# Patient Record
Sex: MALE | Race: WHITE | NOT HISPANIC OR LATINO | Employment: FULL TIME | URBAN - NONMETROPOLITAN AREA
[De-identification: names, ages, dates, MRNs, and addresses within clinical notes are randomized per-mention and may not be internally consistent; named-entity substitution may affect disease eponyms.]

---

## 2017-10-23 ENCOUNTER — OFFICE VISIT (OUTPATIENT)
Dept: FAMILY MEDICINE | Facility: OTHER | Age: 27
End: 2017-10-23
Attending: PHYSICIAN ASSISTANT
Payer: COMMERCIAL

## 2017-10-23 VITALS
DIASTOLIC BLOOD PRESSURE: 60 MMHG | BODY MASS INDEX: 25.62 KG/M2 | HEIGHT: 71 IN | TEMPERATURE: 97.9 F | SYSTOLIC BLOOD PRESSURE: 120 MMHG | OXYGEN SATURATION: 98 % | HEART RATE: 81 BPM | WEIGHT: 183 LBS

## 2017-10-23 DIAGNOSIS — Z00.00 ROUTINE GENERAL MEDICAL EXAMINATION AT A HEALTH CARE FACILITY: ICD-10-CM

## 2017-10-23 DIAGNOSIS — Z23 NEED FOR VACCINATION: Primary | ICD-10-CM

## 2017-10-23 PROCEDURE — 99395 PREV VISIT EST AGE 18-39: CPT | Mod: 25 | Performed by: PHYSICIAN ASSISTANT

## 2017-10-23 PROCEDURE — 90715 TDAP VACCINE 7 YRS/> IM: CPT | Performed by: PHYSICIAN ASSISTANT

## 2017-10-23 PROCEDURE — 90471 IMMUNIZATION ADMIN: CPT | Performed by: PHYSICIAN ASSISTANT

## 2017-10-23 ASSESSMENT — PATIENT HEALTH QUESTIONNAIRE - PHQ9
SUM OF ALL RESPONSES TO PHQ QUESTIONS 1-9: 3
5. POOR APPETITE OR OVEREATING: SEVERAL DAYS

## 2017-10-23 ASSESSMENT — ANXIETY QUESTIONNAIRES
5. BEING SO RESTLESS THAT IT IS HARD TO SIT STILL: NOT AT ALL
1. FEELING NERVOUS, ANXIOUS, OR ON EDGE: SEVERAL DAYS
GAD7 TOTAL SCORE: 7
3. WORRYING TOO MUCH ABOUT DIFFERENT THINGS: MORE THAN HALF THE DAYS
2. NOT BEING ABLE TO STOP OR CONTROL WORRYING: MORE THAN HALF THE DAYS
6. BECOMING EASILY ANNOYED OR IRRITABLE: SEVERAL DAYS
IF YOU CHECKED OFF ANY PROBLEMS ON THIS QUESTIONNAIRE, HOW DIFFICULT HAVE THESE PROBLEMS MADE IT FOR YOU TO DO YOUR WORK, TAKE CARE OF THINGS AT HOME, OR GET ALONG WITH OTHER PEOPLE: SOMEWHAT DIFFICULT
7. FEELING AFRAID AS IF SOMETHING AWFUL MIGHT HAPPEN: NOT AT ALL

## 2017-10-23 ASSESSMENT — PAIN SCALES - GENERAL: PAINLEVEL: NO PAIN (0)

## 2017-10-23 NOTE — PROGRESS NOTES
Subjective:  Kye Barlow is a 27 year old male who presents for work physical exam. No concerns    Past Medical History:   Diagnosis Date     Bronchitis, not specified as acute or chronic 3/1/2000       History reviewed. No pertinent surgical history.    History reviewed. No pertinent family history.    Social History   Substance Use Topics     Smoking status: Never Smoker     Smokeless tobacco: Current User     Types: Chew     Alcohol use Yes      Comment: social       No current outpatient prescriptions on file.     No current facility-administered medications for this visit.        No Known Allergies    Review of Systems:  Gen: negative for fever, chills, change in weight  Derm: negative for worrisome rashes, moles or lesions  Eyes: negative for vision changes or irritation  ENT: negative for ear, mouth and throat problems  Resp: negative for significant cough or SOB  CV: negative for chest pain, palpitations or peripheral edema  GI: negative for nausea, abdominal pain, heartburn, or change in bowel habits  : negative for dysuria, hematuria  Musculoskeletal: negative for significant arthralgias or myalgia  Neuro: negative for weakness, dizziness or paresthesias  Endo: negative for temperature intolerance, skin/hair changes  Psych: negative for changes in mood or affect    Objective:  B/P: 120/60, T: 97.9, P: 81, R: Data Unavailable  183 lbs 0 oz Body mass index is 25.89 kg/(m^2).           Physical Exam:  Constitutional: healthy, alert and no distress  Skin: No suspicious rash or skin lesion  Head: Normocephalic. No masses, lesions, tenderness or abnormalities  ENT: Oropharynx moist and pink. EAC's and TM's intact. No cervical lymphadenopathy. No thyromegaly  CV: RRR. No murmur. No pretibial edema  Pulm: Lungs clear to auscultation. No rhonchi, rales or wheeze  GI: Abdomen soft, non-tender. BS normal. No masses, organomegaly. No rebound or guarding.   Musculoskeletal: extremities normal- no gross  deformities noted, gait normal and normal muscle tone  Neuroc: Gait normal. Reflexes normal and symmetric. Sensation grossly WNL. CN 2-12 intact  Psych: Euthymic mood with corresponding affect.        Assessment and Plan:    (Z23) Need for vaccination  (primary encounter diagnosis  Plan: TDAP VACCINE (ADACEL) [55845.002], 1st          Administration  [42535]            (Z00.00) Routine general medical examination at a health care facility  Comment: Healthy.  Plan: Form completed      Stephanie Joyce PA-C

## 2017-10-23 NOTE — NURSING NOTE
"Chief Complaint   Patient presents with     Physical       Initial /60 (BP Location: Right arm, Patient Position: Chair, Cuff Size: Adult Regular)  Pulse 81  Temp 97.9  F (36.6  C) (Tympanic)  Ht 5' 10.5\" (1.791 m)  Wt 183 lb (83 kg)  SpO2 98%  BMI 25.89 kg/m2 Estimated body mass index is 25.89 kg/(m^2) as calculated from the following:    Height as of this encounter: 5' 10.5\" (1.791 m).    Weight as of this encounter: 183 lb (83 kg).  Medication Reconciliation: complete   LEVI ABURTO      "

## 2017-10-23 NOTE — MR AVS SNAPSHOT
"              After Visit Summary   10/23/2017    Kye Barlow    MRN: [id number]           Patient Information     Date Of Birth          1990        Visit Information        Provider Department      10/23/2017 2:30 PM Stephanie Joyce PA [facility]        Today's Diagnoses     Need for vaccination    -  1    Routine general medical examination at a health care facility           Follow-ups after your visit        Who to contact     If you have questions or need follow up information about today's clinic visit or your schedule please contact [facility] directly at [phone].  Normal or non-critical lab and imaging results will be communicated to you by Pili Pophart, letter or phone within 4 business days after the clinic has received the results. If you do not hear from us within 7 days, please contact the clinic through Pili Pophart or phone. If you have a critical or abnormal lab result, we will notify you by phone as soon as possible.  Submit refill requests through Provender or call your pharmacy and they will forward the refill request to us. Please allow 3 business days for your refill to be completed.          Additional Information About Your Visit        MyChart Information     Provender lets you send messages to your doctor, view your test results, renew your prescriptions, schedule appointments and more. To sign up, go to [email or web address] . Click on \"Log in\" on the left side of the screen, which will take you to the Welcome page. Then click on \"Sign up Now\" on the right side of the page.     You will be asked to enter the access code listed below, as well as some personal information. Please follow the directions to create your username and password.     Your access code is: [email or web address]  Expires: 2018  4:12 PM     Your access code will  in 90 days. If you need help or a new code, please call your [facility] or [phone].        Care EveryWhere ID  " "   This is your Care EveryWhere ID. This could be used by other organizations to access your [facility] medical records  [id number]        Your Vitals Were     Pulse Temperature Height Pulse Oximetry BMI (Body Mass Index)       81 97.9  F (36.6  C) (Tympanic) 5' 10.5\" (1.791 m) 98% 25.89 kg/m2        Blood Pressure from Last 3 Encounters:   10/23/17 120/60   02/23/15 109/73   02/13/15 130/80    Weight from Last 3 Encounters:   10/23/17 183 lb (83 kg)   02/23/15 185 lb (83.9 kg)   02/13/15 185 lb (83.9 kg)              We Performed the Following     1st  Administration  [67479]     TDAP VACCINE (ADACEL) [79614.002]        Primary Care Provider Office Phone # Fax #    Chivo Kemp [phone]       [address]  [facility] [address]        Equal Access to Services     BENEDICT REY : Hadii aad ku hadasho Soomaali, waaxda luqadaha, qaybta kaalmada adeegyada, waxay idiin hayaan adeeg kharash la'ramiro . So [facility] [phone].    ATENCIÓN: Si habla español, tiene a gupta disposición servicios gratuitos de asistencia lingüística. [facility] [phone].    We comply with applicable federal civil rights laws and [place] laws. We do not discriminate on the basis of race, color, national origin, age, disability, sex, sexual orientation, or gender identity.            Thank you!     Thank you for choosing [facility]  for your care. Our goal is always to provide you with excellent care. Hearing back from our patients is one way we can continue to improve our services. Please take a few minutes to complete the written survey that you may receive in the mail after your visit with us. Thank you!             Your Updated Medication List - Protect others around you: Learn how to safely use, store and throw away your medicines at www.disposemymeds.org.      Notice  As of 10/23/2017  4:12 PM    You have not been prescribed any medications.      "

## 2017-10-24 ASSESSMENT — ANXIETY QUESTIONNAIRES: GAD7 TOTAL SCORE: 7

## 2017-12-07 ENCOUNTER — APPOINTMENT (OUTPATIENT)
Dept: OCCUPATIONAL MEDICINE | Facility: OTHER | Age: 27
End: 2017-12-07

## 2017-12-07 PROCEDURE — 99000 SPECIMEN HANDLING OFFICE-LAB: CPT

## 2019-10-15 ENCOUNTER — APPOINTMENT (OUTPATIENT)
Dept: OCCUPATIONAL MEDICINE | Facility: OTHER | Age: 29
End: 2019-10-15

## 2019-10-15 PROCEDURE — 99000 SPECIMEN HANDLING OFFICE-LAB: CPT

## 2020-01-24 ENCOUNTER — OFFICE VISIT (OUTPATIENT)
Dept: FAMILY MEDICINE | Facility: OTHER | Age: 30
End: 2020-01-24
Attending: NURSE PRACTITIONER
Payer: COMMERCIAL

## 2020-01-24 VITALS
HEART RATE: 72 BPM | SYSTOLIC BLOOD PRESSURE: 120 MMHG | WEIGHT: 203.4 LBS | TEMPERATURE: 97.2 F | BODY MASS INDEX: 28.77 KG/M2 | DIASTOLIC BLOOD PRESSURE: 80 MMHG | OXYGEN SATURATION: 99 %

## 2020-01-24 DIAGNOSIS — H61.21 IMPACTED CERUMEN OF RIGHT EAR: ICD-10-CM

## 2020-01-24 DIAGNOSIS — H65.92 LEFT NON-SUPPURATIVE OTITIS MEDIA: Primary | ICD-10-CM

## 2020-01-24 PROCEDURE — 99214 OFFICE O/P EST MOD 30 MIN: CPT | Performed by: NURSE PRACTITIONER

## 2020-01-24 RX ORDER — AMOXICILLIN 875 MG
875 TABLET ORAL 2 TIMES DAILY
Qty: 20 TABLET | Refills: 0 | Status: SHIPPED | OUTPATIENT
Start: 2020-01-24 | End: 2020-03-02

## 2020-01-24 ASSESSMENT — ANXIETY QUESTIONNAIRES
IF YOU CHECKED OFF ANY PROBLEMS ON THIS QUESTIONNAIRE, HOW DIFFICULT HAVE THESE PROBLEMS MADE IT FOR YOU TO DO YOUR WORK, TAKE CARE OF THINGS AT HOME, OR GET ALONG WITH OTHER PEOPLE: NOT DIFFICULT AT ALL
1. FEELING NERVOUS, ANXIOUS, OR ON EDGE: SEVERAL DAYS
3. WORRYING TOO MUCH ABOUT DIFFERENT THINGS: SEVERAL DAYS
GAD7 TOTAL SCORE: 8
2. NOT BEING ABLE TO STOP OR CONTROL WORRYING: MORE THAN HALF THE DAYS
7. FEELING AFRAID AS IF SOMETHING AWFUL MIGHT HAPPEN: NOT AT ALL
6. BECOMING EASILY ANNOYED OR IRRITABLE: SEVERAL DAYS
5. BEING SO RESTLESS THAT IT IS HARD TO SIT STILL: SEVERAL DAYS

## 2020-01-24 ASSESSMENT — PATIENT HEALTH QUESTIONNAIRE - PHQ9
SUM OF ALL RESPONSES TO PHQ QUESTIONS 1-9: 10
5. POOR APPETITE OR OVEREATING: MORE THAN HALF THE DAYS

## 2020-01-24 ASSESSMENT — PAIN SCALES - GENERAL: PAINLEVEL: SEVERE PAIN (7)

## 2020-01-24 NOTE — PROGRESS NOTES
Subjective     Kye Barlow is a 29 year old male who presents to clinic today for the following health issues:    HPI  Ear pain       Duration: 3 days     Description (location/character/radiation): right ear     Intensity:  7/10    Accompanying signs and symptoms: pain, cant sleep at night . tinnitis    History (similar episodes/previous evaluation): has had ear problems since high school.     Precipitating or alleviating factors: None    Therapies tried and outcome: ear candle , ibu- yesterday. Nothing helping.        There is no problem list on file for this patient.    History reviewed. No pertinent surgical history.    Social History     Tobacco Use     Smoking status: Never Smoker     Smokeless tobacco: Current User     Types: Chew     Tobacco comment: declined quit plan 1/24/2020   Substance Use Topics     Alcohol use: Yes     Comment: social     History reviewed. No pertinent family history.      No current outpatient medications on file.     No Known Allergies    Reviewed and updated as needed this visit by Provider      Review of Systems   ROS COMP: Constitutional, HEENT, cardiovascular, pulmonary, gi and gu systems are negative, except as otherwise noted.+ear pain      Objective    /80   Pulse 72   Temp 97.2  F (36.2  C)   Wt 92.3 kg (203 lb 6.4 oz)   SpO2 99%   BMI 28.77 kg/m    Body mass index is 28.77 kg/m .  Physical Exam   GENERAL: healthy, alert and no distress  HENT: +right ear canal with cerumen impaction (after ear flush, clear effusion behind TM). +left middle ear with erythema and slight bulge to TM. Bilateral TM's intact. nose and mouth without ulcers or lesions  NECK: no adenopathy, no asymmetry, masses, or scars and thyroid normal to palpation  RESP: lungs clear to auscultation - no rales, rhonchi or wheezes  CV: regular rate and rhythm, normal S1 S2, no S3 or S4, no murmur, click or rub, no peripheral edema and peripheral pulses strong  MS: no gross musculoskeletal defects  noted, no edema  SKIN: no suspicious lesions or rashes  NEURO: Normal strength and tone, mentation intact and speech normal    Diagnostic Test Results:  Labs reviewed in Epic        Assessment & Plan   Assessment      Plan  (H65.92) Left non-suppurative otitis media  (primary encounter diagnosis)  Comment: ABX. Supportive care discussed  Plan: amoxicillin (AMOXIL) 875 MG tablet           (H61.21) Impacted cerumen of right ear  Comment: right ear flush per LPN with large amount of cerumen return  Plan: clear fluid behind tm. Add zyrtec daily for 10 days    Tobacco Cessation:   reports that he has never smoked. His smokeless tobacco use includes chew.      See Patient Instructions    Return if symptoms worsen or fail to improve.    Jessica Marin NP  [facility]

## 2020-01-24 NOTE — PATIENT INSTRUCTIONS
Patient Education     Reducing the Risk of Middle Ear Infections     Good handwashing can help your child prevent ear infections.     Most children have had at least one middle ear infection by the age of 2. Treatment may depend on whether the problem is acute or chronic. It also depends on how often it comes back and how long it lasts.  Reducing risk factors  Some behaviors or surroundings increase your child s risk of ear infection. Reducing such risk factors can be helpful at any point in treatment. The tips below may help:    If your child goes to group , he or she runs a greater risk of getting colds or flu. This may then lead to an ear infection. Help prevent these illnesses by teaching your child to wash his or her hands often.    If your child has nasal allergies, do your best to control dust, mold, mildew, and pet hair in the house. Also stop or greatly limit your child s contact with secondhand smoke.    If food allergies are a problem, identify the food that triggers the reaction. Help your child avoid it.  Watching and waiting  Sometimes it is better to proceed with caution in the following ways:    If your child is diagnosed with an ear infection, the healthcare provider may prescribe antibiotics and will suggest a period of  watchful waiting.  This means not filling any prescriptions right away. Instead of antibiotics, a trial of medicines to relieve symptoms including those for pain or fever, is advised. This is along with waiting some time to see if a child improves without antibiotic therapy. Whether or not your healthcare provider prescribes immediate antibiotics or a period of watchful waiting depends on your child's age and risk factors.    During this time, your child should be watched to see if his or her symptoms are improving and to make sure new symptoms, such as fever or vomiting, don't develop. If a child doesn't improve within a few days or develops new symptoms, antibiotics will  usually be started.    Date Last Reviewed: 12/1/2016 2000-2019 [employer]. [address] [place] [address], [place] [address]. All rights reserved. This information is not intended as a substitute for professional medical care. Always follow your healthcare professional's instructions.    Patient Education       Earwax, Home Treatment    Everyone produces earwax from the lining of the ear canal. It serves to lubricate and protect the ear. The wax that forms in the canal naturally moves toward the outside of the ear and falls out. Sometimes the ear canal may contain too much wax. This can cause a blockage and loss of hearing. Directions are given below for home treatment.  Home care  If your doctor has advised you to remove a wax blockage yourself, follow these directions:    Unless a medicine was prescribed, you may use an over-the-counter product made for clearing earwax. These contain carbamide peroxide. Lie down with the blocked ear facing upward. Apply one dropper full of medicine and wait a few minutes. Grasp the outer ear and wiggle it to help the solution enter the canal.    Lean over a sink or basin with the blocked ear facing downward. Use a bulb syringe filled with warm (not hot or cold) water to rinse the ear several times. Use gentle pressure only.    If you are having trouble draining the water out of your ear canal, put a few drops of rubbing alcohol (isopropyl alcohol) into the ear canal. This will help remove the remaining water.    Repeat this procedure once a day for up to three days, or until your hearing is back to normal. Do not use this treatment for more than three days in a row.  Don ts    Don t use cold water to rinse the ear. This will make you dizzy.    Don t perform this procedure if you have an ear infection.    Don t perform this procedure if you have a ruptured eardrum.    Don t use cotton swabs, matches, hairpins, keys, or other objects to  clean  the ear canal. This can  cause infection of the ear canal or rupture the eardrum. Because of their size and shape, cotton swabs can push earwax deeper into the ear canal instead of removing it.  Follow-up care  Follow up with your health care provider if you are not improving after three cleaning attempts, or as advised.  When to seek medical advice  Call your health care provider right away if any of these occur:    Worsening ear pain    Fever of 101 F (38.3 C) or higher, or as directed by your health care provider    Hearing does not return to normal after three days of treatment    Fluid drainage or bleeding from the ear canal    Swelling, redness, or tenderness of the outer ear    Headache, neck pain, or stiff neck    7794-6474 [employer] [address] [place] [address], [place] [address]. All rights reserved. This information is not intended as a substitute for professional medical care. Always follow your healthcare professional's instructions.    Take antibiotics as ordered.   Eat a yogurt daily while taking antibiotics.   Take Zyrtec 10 mg daily for 10 days. Over the counter.   Increase fluid intake.   Follow up if not improving.

## 2020-01-25 ASSESSMENT — ANXIETY QUESTIONNAIRES: GAD7 TOTAL SCORE: 8

## 2020-02-28 NOTE — PROGRESS NOTES
3  SUBJECTIVE:   CC: Kye Barlow is an 30 year old male who presents for preventive health visit.     Healthy Habits:    Do you get at least three servings of calcium containing foods daily (dairy, green leafy vegetables, etc.)? yes    Amount of exercise or daily activities, outside of work: 3 day(s) per week    Problems taking medications regularly not applicable    Medication side effects: No    Have you had an eye exam in the past two years? no    Do you see a dentist twice per year? yes    Do you have sleep apnea, excessive snoring or daytime drowsiness?no        Today's PHQ-2 Score:   PHQ-2 ( 1999 Pfizer) 2/13/2015   Q1: Little interest or pleasure in doing things 0   Q2: Feeling down, depressed or hopeless 0   PHQ-2 Score 0       Abuse: Current or Past(Physical, Sexual or Emotional)- No  Do you feel safe in your environment? Yes        Social History     Tobacco Use     Smoking status: Never Smoker     Smokeless tobacco: Current User     Types: Chew     Tobacco comment: declined quit plan 1/24/2020   Substance Use Topics     Alcohol use: Yes     Comment: social     If you drink alcohol do you typically have >3 drinks per day or >7 drinks per week? No                      Last PSA: No results found for: PSA    Reviewed orders with patient. Reviewed health maintenance and updated orders accordingly - Yes  Lab work is in process    Reviewed and updated as needed this visit by clinical staff  Tobacco  Allergies  Meds         Reviewed and updated as needed this visit by Provider        Past Medical History:   Diagnosis Date     Bronchitis, not specified as acute or chronic 3/1/2000      No past surgical history on file.    ROS:  CONSTITUTIONAL: NEGATIVE for fever, chills, change in weight  INTEGUMENTARY/SKIN: NEGATIVE for worrisome rashes, moles or lesions  EYES: NEGATIVE for vision changes or irritation  ENT: NEGATIVE for ear, mouth and throat problems  RESP: NEGATIVE for significant cough or SOB  CV:  "NEGATIVE for chest pain, palpitations or peripheral edema  GI: NEGATIVE for nausea, abdominal pain, heartburn, or change in bowel habits   male: negative for dysuria, hematuria, decreased urinary stream, erectile dysfunction, urethral discharge  MUSCULOSKELETAL: NEGATIVE for significant arthralgias or myalgia  NEURO: NEGATIVE for weakness, dizziness or paresthesias  PSYCHIATRIC: NEGATIVE for changes in mood or affect    OBJECTIVE:   /72   Pulse 96   Ht 1.753 m (5' 9\")   Wt 90.7 kg (200 lb)   SpO2 97%   BMI 29.53 kg/m    EXAM:  GENERAL: healthy, alert and no distress  EYES: Eyes grossly normal to inspection, PERRL and conjunctivae and sclerae normal  HENT: ear canals and TM's normal, nose and mouth without ulcers or lesions  NECK: no adenopathy, no asymmetry, masses, or scars and thyroid normal to palpation  RESP: lungs clear to auscultation - no rales, rhonchi or wheezes  CV: regular rate and rhythm, normal S1 S2, no S3 or S4, no murmur, click or rub, no peripheral edema and peripheral pulses strong  ABDOMEN: soft, nontender, no hepatosplenomegaly, no masses and bowel sounds normal   (male): normal male genitalia without lesions or urethral discharge, no hernia  MS: no gross musculoskeletal defects noted, no edema  SKIN: no suspicious lesions or rashes  NEURO: Normal strength and tone, mentation intact and speech normal  PSYCH: mentation appears normal, affect normal/bright    Diagnostic Test Results:  Labs reviewed in Epic    ASSESSMENT/PLAN:       ICD-10-CM    1. Routine general medical examination at a health care facility Z00.00 Comprehensive metabolic panel     Lipid Profile   2. Screening for HIV (human immunodeficiency virus) Z11.4 HIV Antigen Antibody Combo   3. Travel advice encounter Z71.84      Review.  Doing well.  Update annual labs.  Reviewed CDC website.  getting him set up for hep A and typhoid.  Discussed recommendations.  f/u annually.      COUNSELING:  Reviewed preventive health " "counseling, as reflected in patient instructions    Estimated body mass index is 29.53 kg/m  as calculated from the following:    Height as of this encounter: 1.753 m (5' 9\").    Weight as of this encounter: 90.7 kg (200 lb).         reports that he has never smoked. His smokeless tobacco use includes chew.      Counseling Resources:  ATP IV Guidelines  Pooled Cohorts Equation Calculator  FRAX Risk Assessment  ICSI Preventive Guidelines  Dietary Guidelines for Americans, 2010  USDA's MyPlate  ASA Prophylaxis  Lung CA Screening    Chivo Kemp MD  [facility]  "

## 2020-03-02 ENCOUNTER — OFFICE VISIT (OUTPATIENT)
Dept: FAMILY MEDICINE | Facility: OTHER | Age: 30
End: 2020-03-02
Attending: FAMILY MEDICINE
Payer: COMMERCIAL

## 2020-03-02 VITALS
WEIGHT: 200 LBS | SYSTOLIC BLOOD PRESSURE: 116 MMHG | BODY MASS INDEX: 29.62 KG/M2 | OXYGEN SATURATION: 97 % | DIASTOLIC BLOOD PRESSURE: 72 MMHG | HEART RATE: 96 BPM | HEIGHT: 69 IN

## 2020-03-02 DIAGNOSIS — Z11.4 SCREENING FOR HIV (HUMAN IMMUNODEFICIENCY VIRUS): ICD-10-CM

## 2020-03-02 DIAGNOSIS — Z00.00 ROUTINE GENERAL MEDICAL EXAMINATION AT A HEALTH CARE FACILITY: Primary | ICD-10-CM

## 2020-03-02 DIAGNOSIS — Z71.84 TRAVEL ADVICE ENCOUNTER: ICD-10-CM

## 2020-03-02 PROCEDURE — 80053 COMPREHEN METABOLIC PANEL: CPT | Performed by: FAMILY MEDICINE

## 2020-03-02 PROCEDURE — 87389 HIV-1 AG W/HIV-1&-2 AB AG IA: CPT | Performed by: FAMILY MEDICINE

## 2020-03-02 PROCEDURE — 99395 PREV VISIT EST AGE 18-39: CPT | Performed by: FAMILY MEDICINE

## 2020-03-02 PROCEDURE — 36415 COLL VENOUS BLD VENIPUNCTURE: CPT | Performed by: FAMILY MEDICINE

## 2020-03-02 PROCEDURE — 80061 LIPID PANEL: CPT | Performed by: FAMILY MEDICINE

## 2020-03-02 ASSESSMENT — PATIENT HEALTH QUESTIONNAIRE - PHQ9: SUM OF ALL RESPONSES TO PHQ QUESTIONS 1-9: 1

## 2020-03-02 ASSESSMENT — PAIN SCALES - GENERAL: PAINLEVEL: NO PAIN (0)

## 2020-03-02 ASSESSMENT — MIFFLIN-ST. JEOR: SCORE: 1857.57

## 2020-03-02 NOTE — NURSING NOTE
"Chief Complaint   Patient presents with     Physical       Initial /72   Pulse 96   Ht 1.753 m (5' 9\")   Wt 90.7 kg (200 lb)   SpO2 97%   BMI 29.53 kg/m   Estimated body mass index is 29.53 kg/m  as calculated from the following:    Height as of this encounter: 1.753 m (5' 9\").    Weight as of this encounter: 90.7 kg (200 lb).  Medication Reconciliation: complete  Agueda May LPN  "

## 2020-03-03 LAB
ALBUMIN SERPL-MCNC: 4.4 G/DL (ref 3.4–5)
ALP SERPL-CCNC: 61 U/L (ref 40–150)
ALT SERPL W P-5'-P-CCNC: 54 U/L (ref 0–70)
ANION GAP SERPL CALCULATED.3IONS-SCNC: 9 MMOL/L (ref 3–14)
AST SERPL W P-5'-P-CCNC: 27 U/L (ref 0–45)
BILIRUB SERPL-MCNC: 1.1 MG/DL (ref 0.2–1.3)
BUN SERPL-MCNC: 16 MG/DL (ref 7–30)
CALCIUM SERPL-MCNC: 9.3 MG/DL (ref 8.5–10.1)
CHLORIDE SERPL-SCNC: 100 MMOL/L (ref 94–109)
CHOLEST SERPL-MCNC: 213 MG/DL
CO2 SERPL-SCNC: 26 MMOL/L (ref 20–32)
CREAT SERPL-MCNC: 1.03 MG/DL (ref 0.66–1.25)
GFR SERPL CREATININE-BSD FRML MDRD: >90 ML/MIN/{1.73_M2}
GLUCOSE SERPL-MCNC: 85 MG/DL (ref 70–99)
HDLC SERPL-MCNC: 58 MG/DL
LDLC SERPL CALC-MCNC: 121 MG/DL
NONHDLC SERPL-MCNC: 155 MG/DL
POTASSIUM SERPL-SCNC: 3.7 MMOL/L (ref 3.4–5.3)
PROT SERPL-MCNC: 8.4 G/DL (ref 6.8–8.8)
SODIUM SERPL-SCNC: 135 MMOL/L (ref 133–144)
TRIGL SERPL-MCNC: 169 MG/DL

## 2020-03-04 LAB — HIV 1+2 AB+HIV1 P24 AG SERPL QL IA: NONREACTIVE

## 2020-03-06 ENCOUNTER — ALLIED HEALTH/NURSE VISIT (OUTPATIENT)
Dept: ALLERGY | Facility: OTHER | Age: 30
End: 2020-03-06
Attending: FAMILY MEDICINE
Payer: COMMERCIAL

## 2020-03-06 DIAGNOSIS — Z23 NEED FOR PROPHYLACTIC VACCINATION AND INOCULATION AGAINST INFLUENZA: Primary | ICD-10-CM

## 2020-03-06 DIAGNOSIS — Z23 ENCOUNTER FOR IMMUNIZATION: ICD-10-CM

## 2020-03-06 PROCEDURE — 90746 HEPB VACCINE 3 DOSE ADULT IM: CPT

## 2020-03-06 PROCEDURE — 90691 TYPHOID VACCINE IM: CPT

## 2020-03-06 PROCEDURE — 90686 IIV4 VACC NO PRSV 0.5 ML IM: CPT

## 2020-03-06 PROCEDURE — 90472 IMMUNIZATION ADMIN EACH ADD: CPT

## 2020-03-06 PROCEDURE — 90471 IMMUNIZATION ADMIN: CPT

## 2020-03-06 PROCEDURE — 90632 HEPA VACCINE ADULT IM: CPT

## 2020-10-20 DIAGNOSIS — Z31.41 FERTILITY TESTING: Primary | ICD-10-CM

## 2020-10-20 NOTE — PROGRESS NOTES
Semen analysis ordered per Dr. Stephanie Garcia MD.    Alexus Harvey RN...................10/20/2020 3:29 PM

## 2020-10-23 DIAGNOSIS — Z31.41 FERTILITY TESTING: ICD-10-CM

## 2020-10-23 LAB
COLOR SMN: NORMAL
LIQUEFICATION: NORMAL
PH SMN: 8 PH
SECOMT: NORMAL
SEMEN COUNT: 209 M/ML
SPECIMEN VOL SMN: 4 ML
SPERM PROG 1 NFR SMN: 95 %
VISC SMN: NORMAL CP

## 2020-10-23 PROCEDURE — 89322 SEMEN ANAL STRICT CRITERIA: CPT | Mod: ZL | Performed by: OBSTETRICS & GYNECOLOGY

## 2020-12-14 ENCOUNTER — HEALTH MAINTENANCE LETTER (OUTPATIENT)
Age: 30
End: 2020-12-14

## 2021-03-23 ENCOUNTER — APPOINTMENT (OUTPATIENT)
Dept: OCCUPATIONAL MEDICINE | Facility: OTHER | Age: 31
End: 2021-03-23

## 2021-03-23 PROCEDURE — 99000 SPECIMEN HANDLING OFFICE-LAB: CPT

## 2021-04-01 ENCOUNTER — IMMUNIZATION (OUTPATIENT)
Dept: FAMILY MEDICINE | Facility: OTHER | Age: 31
End: 2021-04-01
Attending: FAMILY MEDICINE
Payer: COMMERCIAL

## 2021-04-01 PROCEDURE — 0031A PR COVID VAC JANSSEN AD26 0.5ML: CPT

## 2021-04-01 PROCEDURE — 91303 PR COVID VAC JANSSEN AD26 0.5ML: CPT

## 2021-04-13 ENCOUNTER — MYC MEDICAL ADVICE (OUTPATIENT)
Dept: FAMILY MEDICINE | Facility: OTHER | Age: 31
End: 2021-04-13

## 2021-04-13 NOTE — TELEPHONE ENCOUNTER
"Pt states for the last 2 months he has had \"throbbing heart pain\" from the time he wakes up and it last all day everyday.  He has some SOB at times, pain is worse with inhalation.  Please advise.  "

## 2021-04-18 ENCOUNTER — HEALTH MAINTENANCE LETTER (OUTPATIENT)
Age: 31
End: 2021-04-18

## 2021-04-20 NOTE — PROGRESS NOTES
"Subjective     Kye Barlow is a 31 year old male who presents to clinic today for the following health issues:    HPI          Chest pain       Duration: months    Description (location/character/radiation): left side     Intensity:  moderate    Accompanying signs and symptoms: pain with inhalation    History (similar episodes/previous evaluation): None    Precipitating or alleviating factors: None    Therapies tried and outcome: None        Lengthy review today.  On exertion, no pain.  On rest, he gets the pain.  It varies with breathing, but never goes away entirely.  Father had MI last year.  No change in pain with moving arms, etc.  We talked about workup, risk factors for CAD, etc.  See below.      Also of note he offers a hx of anxiety; wondering if this could play a role.  With the pleuritic and cyclic nature it seems unlikely but we talked about anxiety in general as well.          Reviewed and updated as needed this visit by Provider                 Review of Systems   Constitutional, HEENT, cardiovascular, pulmonary, gi and gu systems are negative, except as otherwise noted.  Some slight left sided abd pain along with some constipation.        Objective    /74   Pulse 99   Ht 1.778 m (5' 10\")   Wt 89.8 kg (198 lb)   SpO2 98%   BMI 28.41 kg/m    Body mass index is 28.41 kg/m .  Physical Exam   GENERAL: healthy, alert and no distress  NECK: no adenopathy, no asymmetry, masses, or scars and thyroid normal to palpation  RESP: lungs clear to auscultation - no rales, rhonchi or wheezes  CV: regular rate and rhythm, normal S1 S2, no S3 or S4, no murmur, click or rub, no peripheral edema and peripheral pulses strong  ABDOMEN: soft, nontender, no hepatosplenomegaly, no masses and bowel sounds normal  MS: no gross musculoskeletal defects noted, no edema    Diagnostic Test Results:  Labs reviewed in Epic        Assessment & Plan      (R07.89) Atypical chest pain  (primary encounter diagnosis)  Comment: " "reviewed at length as above.    Plan: EKG 12-lead complete w/read - (Clinic         Performed), XR CHEST 2 VW (Clinic Performed),         Lipid Profile (Chol, Trig, HDL, LDL calc),         Comprehensive metabolic panel (BMP + Alb, Alk         Phos, ALT, AST, Total. Bili, TP), CBC with         platelets and differential        EKG normal.  CXR normal.  Labs pending basically full lab panel.  Doubt GI as sx more consistent with either chest wall or pleura.  Trial of 2 week nsaid course to try and calm this down and take it from there.  Low level of concern for catastrophic problem given the workup today but he is going to report any ongoing concerns promptly.      (Z11.59) Need for hepatitis C screening test  Comment: done.   Plan: Hepatitis C Screen Reflex to HCV RNA Quant and         Genotype        Done.     (Z82.49) Family history of ischemic heart disease  Comment: as above.    Plan: EKG 12-lead complete w/read - (Clinic         Performed), XR CHEST 2 VW (Clinic Performed),         Lipid Profile (Chol, Trig, HDL, LDL calc),         Comprehensive metabolic panel (BMP + Alb, Alk         Phos, ALT, AST, Total. Bili, TP), CBC with         platelets and differential        Getting risk factor assessment today with the labs and I educated him about the heart, etc.        (F41.9) Anxiety  Comment: discussed options.    Plan: escitalopram (LEXAPRO) 10 MG tablet        Risk/benefit and side effect profile discussed at some length.  Going with lexapro trial and 6 week f/u.             Tobacco Cessation:   reports that he has never smoked. His smokeless tobacco use includes chew.        BMI:   Estimated body mass index is 28.41 kg/m  as calculated from the following:    Height as of this encounter: 1.778 m (5' 10\").    Weight as of this encounter: 89.8 kg (198 lb).                No follow-ups on file.    Chivo Kemp MD  [facility]  "

## 2021-04-22 ENCOUNTER — ANCILLARY PROCEDURE (OUTPATIENT)
Dept: GENERAL RADIOLOGY | Facility: OTHER | Age: 31
End: 2021-04-22
Attending: FAMILY MEDICINE
Payer: COMMERCIAL

## 2021-04-22 ENCOUNTER — OFFICE VISIT (OUTPATIENT)
Dept: FAMILY MEDICINE | Facility: OTHER | Age: 31
End: 2021-04-22
Attending: FAMILY MEDICINE
Payer: COMMERCIAL

## 2021-04-22 VITALS
OXYGEN SATURATION: 98 % | BODY MASS INDEX: 28.35 KG/M2 | HEART RATE: 99 BPM | HEIGHT: 70 IN | WEIGHT: 198 LBS | DIASTOLIC BLOOD PRESSURE: 74 MMHG | SYSTOLIC BLOOD PRESSURE: 122 MMHG

## 2021-04-22 DIAGNOSIS — Z11.59 NEED FOR HEPATITIS C SCREENING TEST: ICD-10-CM

## 2021-04-22 DIAGNOSIS — Z82.49 FAMILY HISTORY OF ISCHEMIC HEART DISEASE: ICD-10-CM

## 2021-04-22 DIAGNOSIS — R07.89 ATYPICAL CHEST PAIN: Primary | ICD-10-CM

## 2021-04-22 DIAGNOSIS — R07.89 ATYPICAL CHEST PAIN: ICD-10-CM

## 2021-04-22 DIAGNOSIS — F41.9 ANXIETY: ICD-10-CM

## 2021-04-22 LAB
ALBUMIN SERPL-MCNC: 4.4 G/DL (ref 3.4–5)
ALP SERPL-CCNC: 64 U/L (ref 40–150)
ALT SERPL W P-5'-P-CCNC: 60 U/L (ref 0–70)
ANION GAP SERPL CALCULATED.3IONS-SCNC: 4 MMOL/L (ref 3–14)
AST SERPL W P-5'-P-CCNC: 24 U/L (ref 0–45)
BASOPHILS # BLD AUTO: 0 10E9/L (ref 0–0.2)
BASOPHILS NFR BLD AUTO: 0.3 %
BILIRUB SERPL-MCNC: 0.9 MG/DL (ref 0.2–1.3)
BUN SERPL-MCNC: 16 MG/DL (ref 7–30)
CALCIUM SERPL-MCNC: 9.5 MG/DL (ref 8.5–10.1)
CHLORIDE SERPL-SCNC: 106 MMOL/L (ref 94–109)
CHOLEST SERPL-MCNC: 193 MG/DL
CO2 SERPL-SCNC: 27 MMOL/L (ref 20–32)
CREAT SERPL-MCNC: 1.01 MG/DL (ref 0.66–1.25)
DIFFERENTIAL METHOD BLD: NORMAL
EOSINOPHIL # BLD AUTO: 0.1 10E9/L (ref 0–0.7)
EOSINOPHIL NFR BLD AUTO: 1.4 %
ERYTHROCYTE [DISTWIDTH] IN BLOOD BY AUTOMATED COUNT: 12.4 % (ref 10–15)
GFR SERPL CREATININE-BSD FRML MDRD: >90 ML/MIN/{1.73_M2}
GLUCOSE SERPL-MCNC: 95 MG/DL (ref 70–99)
HCT VFR BLD AUTO: 46 % (ref 40–53)
HDLC SERPL-MCNC: 56 MG/DL
HGB BLD-MCNC: 16 G/DL (ref 13.3–17.7)
LDLC SERPL CALC-MCNC: 119 MG/DL
LYMPHOCYTES # BLD AUTO: 1.4 10E9/L (ref 0.8–5.3)
LYMPHOCYTES NFR BLD AUTO: 18.6 %
MCH RBC QN AUTO: 30.7 PG (ref 26.5–33)
MCHC RBC AUTO-ENTMCNC: 34.8 G/DL (ref 31.5–36.5)
MCV RBC AUTO: 88 FL (ref 78–100)
MONOCYTES # BLD AUTO: 0.8 10E9/L (ref 0–1.3)
MONOCYTES NFR BLD AUTO: 10.3 %
NEUTROPHILS # BLD AUTO: 5.3 10E9/L (ref 1.6–8.3)
NEUTROPHILS NFR BLD AUTO: 69.4 %
NONHDLC SERPL-MCNC: 137 MG/DL
PLATELET # BLD AUTO: 268 10E9/L (ref 150–450)
POTASSIUM SERPL-SCNC: 4.2 MMOL/L (ref 3.4–5.3)
PROT SERPL-MCNC: 8.7 G/DL (ref 6.8–8.8)
RBC # BLD AUTO: 5.21 10E12/L (ref 4.4–5.9)
SODIUM SERPL-SCNC: 137 MMOL/L (ref 133–144)
TRIGL SERPL-MCNC: 88 MG/DL
WBC # BLD AUTO: 7.6 10E9/L (ref 4–11)

## 2021-04-22 PROCEDURE — 93000 ELECTROCARDIOGRAM COMPLETE: CPT | Performed by: INTERNAL MEDICINE

## 2021-04-22 PROCEDURE — 99214 OFFICE O/P EST MOD 30 MIN: CPT | Performed by: FAMILY MEDICINE

## 2021-04-22 PROCEDURE — 36415 COLL VENOUS BLD VENIPUNCTURE: CPT | Performed by: FAMILY MEDICINE

## 2021-04-22 PROCEDURE — 80061 LIPID PANEL: CPT | Performed by: FAMILY MEDICINE

## 2021-04-22 PROCEDURE — 80053 COMPREHEN METABOLIC PANEL: CPT | Performed by: FAMILY MEDICINE

## 2021-04-22 PROCEDURE — 71046 X-RAY EXAM CHEST 2 VIEWS: CPT | Mod: TC | Performed by: RADIOLOGY

## 2021-04-22 PROCEDURE — 85025 COMPLETE CBC W/AUTO DIFF WBC: CPT | Performed by: FAMILY MEDICINE

## 2021-04-22 PROCEDURE — 86803 HEPATITIS C AB TEST: CPT | Performed by: FAMILY MEDICINE

## 2021-04-22 RX ORDER — ESCITALOPRAM OXALATE 10 MG/1
10 TABLET ORAL DAILY
Qty: 90 TABLET | Refills: 3 | Status: SHIPPED | OUTPATIENT
Start: 2021-04-22 | End: 2022-06-30

## 2021-04-22 ASSESSMENT — ANXIETY QUESTIONNAIRES
2. NOT BEING ABLE TO STOP OR CONTROL WORRYING: NEARLY EVERY DAY
3. WORRYING TOO MUCH ABOUT DIFFERENT THINGS: NEARLY EVERY DAY
5. BEING SO RESTLESS THAT IT IS HARD TO SIT STILL: NOT AT ALL
GAD7 TOTAL SCORE: 10
7. FEELING AFRAID AS IF SOMETHING AWFUL MIGHT HAPPEN: NOT AT ALL
6. BECOMING EASILY ANNOYED OR IRRITABLE: MORE THAN HALF THE DAYS
1. FEELING NERVOUS, ANXIOUS, OR ON EDGE: MORE THAN HALF THE DAYS

## 2021-04-22 ASSESSMENT — PATIENT HEALTH QUESTIONNAIRE - PHQ9
SUM OF ALL RESPONSES TO PHQ QUESTIONS 1-9: 3
5. POOR APPETITE OR OVEREATING: NOT AT ALL

## 2021-04-22 ASSESSMENT — PAIN SCALES - GENERAL: PAINLEVEL: SEVERE PAIN (6)

## 2021-04-22 ASSESSMENT — MIFFLIN-ST. JEOR: SCORE: 1859.37

## 2021-04-23 LAB — HCV AB SERPL QL IA: NONREACTIVE

## 2021-04-23 ASSESSMENT — ANXIETY QUESTIONNAIRES: GAD7 TOTAL SCORE: 10

## 2021-10-03 ENCOUNTER — HEALTH MAINTENANCE LETTER (OUTPATIENT)
Age: 31
End: 2021-10-03

## 2022-05-14 ENCOUNTER — HEALTH MAINTENANCE LETTER (OUTPATIENT)
Age: 32
End: 2022-05-14

## 2022-06-28 ENCOUNTER — MYC MEDICAL ADVICE (OUTPATIENT)
Dept: FAMILY MEDICINE | Facility: OTHER | Age: 32
End: 2022-06-28

## 2022-06-28 NOTE — TELEPHONE ENCOUNTER
From wiping and drying out the nose this can occur.  Suggest vaseline to the nostrils gently just inside daily and see if this helps it.  We might need to see you as you might have a possible sinus infection, but as long as no pain or worsening you can keep watching it.  Thanks.  Chivo Kemp MD

## 2022-06-29 ENCOUNTER — TELEPHONE (OUTPATIENT)
Dept: FAMILY MEDICINE | Facility: OTHER | Age: 32
End: 2022-06-29

## 2022-06-29 NOTE — TELEPHONE ENCOUNTER
Patient is requesting appointment with Dr. Kemp or Jessica Marin to be evaluated for possible sinus infection as per communication below.     Patient can be reached at [phone].

## 2022-06-30 ENCOUNTER — OFFICE VISIT (OUTPATIENT)
Dept: FAMILY MEDICINE | Facility: OTHER | Age: 32
End: 2022-06-30
Attending: PHYSICIAN ASSISTANT
Payer: COMMERCIAL

## 2022-06-30 VITALS
RESPIRATION RATE: 18 BRPM | BODY MASS INDEX: 28.27 KG/M2 | WEIGHT: 197 LBS | OXYGEN SATURATION: 94 % | SYSTOLIC BLOOD PRESSURE: 114 MMHG | DIASTOLIC BLOOD PRESSURE: 82 MMHG | TEMPERATURE: 97.3 F | HEART RATE: 96 BPM

## 2022-06-30 DIAGNOSIS — J40 BRONCHITIS: Primary | ICD-10-CM

## 2022-06-30 DIAGNOSIS — R04.0 EPISTAXIS: ICD-10-CM

## 2022-06-30 PROCEDURE — 99214 OFFICE O/P EST MOD 30 MIN: CPT | Performed by: PHYSICIAN ASSISTANT

## 2022-06-30 RX ORDER — AZITHROMYCIN 250 MG/1
TABLET, FILM COATED ORAL
Qty: 6 TABLET | Refills: 0 | Status: SHIPPED | OUTPATIENT
Start: 2022-06-30 | End: 2022-07-05

## 2022-06-30 RX ORDER — PREDNISOLONE ACETATE 10 MG/ML
SUSPENSION/ DROPS OPHTHALMIC
COMMUNITY
Start: 2022-06-03 | End: 2022-11-15

## 2022-06-30 ASSESSMENT — ENCOUNTER SYMPTOMS: COUGH: 1

## 2022-06-30 ASSESSMENT — PAIN SCALES - GENERAL: PAINLEVEL: NO PAIN (0)

## 2022-06-30 NOTE — NURSING NOTE
Pt presents to clinic today for a  Cough and a bloody nose. Patient has been sick now for 3 weeks but the frequent bloody noses started a week ago to where he is pulling clots out of his nose a couple times a day to once a day.       FOOD SECURITY SCREENING QUESTIONS:    The next two questions are to help us understand your food security.  If you are feeling you need any assistance in this area, we have resources available to support you today.    Hunger Vital Signs:  Within the past 12 months we worried whether our food would run out before we got money to buy more. Never  Within the past 12 months the food we bought just didn't last and we didn't have money to get more. Never          Medication Reconciliation: complete  Lily Cárdenas, JIMENEZ,LPN on 6/30/2022 at 10:06 AM

## 2022-06-30 NOTE — PROGRESS NOTES
Assessment & Plan     1. Bronchitis  Due to longevity of symptoms will cover with antibiotic as outlined below.  Educated on medication, use, side effects.  Encourage continued use of over-the-counter cough and cold medication for symptomatic management.  COVID-negative at home.  Follow-up as needed.  - azithromycin (ZITHROMAX) 250 MG tablet; Take 2 tablets (500 mg) by mouth daily for 1 day, THEN 1 tablet (250 mg) daily for 4 days.  Dispense: 6 tablet; Refill: 0    2. Epistaxis  Likely related to current cough and cold symptoms, frequent blowing of nose.  Treatment as above.  Encourage use of Vaseline in nostrils, saline nasal spray.  Follow-up as needed.        Return if symptoms worsen or fail to improve.    Beverley Mcpherson PA-C  [facility] AND [facility]    Live Fishman is a 32 year old, presenting for the following health issues:  Cough and Epistaxis      History of Present Illness       Reason for visit:  Cold  Symptom onset:  1-2 weeks ago    He eats 2-3 servings of fruits and vegetables daily.He consumes 1 sweetened beverage(s) daily.He exercises with enough effort to increase his heart rate 30 to 60 minutes per day.  He exercises with enough effort to increase his heart rate 5 days per week. He is missing 7 dose(s) of medications per week.    Here for evaluation of cough, frequent bloody noses.  Patient reports approximately 3 weeks ago he developed a sore throat and subsequent cough, other cold symptoms.  He has continued to struggle with productive cough.  He had a fever for the last 2 days.  He has also noticed significant increase in frequency of nosebleeds.  He has having 5+ nosebleeds daily.  He has having small clots as well.  Contacted PCP who recommended trying Vaseline in nostrils but patient has not tried this.  Managing symptoms with NyQuil.  Father is sick with similar symptoms, no other known sick contacts.  No associated shortness of breath, wheezing, body aches, headache, GI  symptoms, rash.      PAST MEDICAL HISTORY:   Past Medical History:   Diagnosis Date     Bronchitis, not specified as acute or chronic 3/1/2000       PAST SURGICAL HISTORY: History reviewed. No pertinent surgical history.    FAMILY HISTORY: History reviewed. No pertinent family history.    SOCIAL HISTORY:   Social History     Tobacco Use     Smoking status: Never Smoker     Smokeless tobacco: Current User     Types: Chew     Tobacco comment: declined quit plan 1/24/2020   Substance Use Topics     Alcohol use: Yes     Comment: social      No Known Allergies  Current Outpatient Medications   Medication     azithromycin (ZITHROMAX) 250 MG tablet     escitalopram (LEXAPRO) 10 MG tablet     prednisoLONE acetate (PRED FORTE) 1 % ophthalmic suspension     No current facility-administered medications for this visit.         Review of Systems   Per HPI        Objective    /82 (BP Location: Right arm, Patient Position: Sitting, Cuff Size: Adult Large)   Pulse 96   Temp 97.3  F (36.3  C) (Tympanic)   Resp 18   Wt 89.4 kg (197 lb)   SpO2 94%   BMI 28.27 kg/m    Body mass index is 28.27 kg/m .  Physical Exam   General: Pleasant, in no apparent distress.  Eyes: Sclera are white and conjunctiva are clear bilaterally. Lacrimal apparatus free of erythema, edema, and discharge bilaterally.  Ears: External ears without erythema or edema. Tympanic membranes are pearly white and without erythema, scarring or perforations bilaterally. External auditory canals are free of foreign bodies, erythema, ulcers, and masses.  Nose: External nose is symmetrical and free of lesions and deformities. Mucosa is soft pink and without erythema, edema, bleeding, or exudate. No septal perforation or deviation.  Oropharynx: Oral mucosa is pink and without ulcers, nodules, and white patches. Tongue is symmetrical, pink, and without masses or lesions. Pharynx is pink, symmetrical, and without lesions. Uvula is midline. Tonsils are pink,  symmetrical, and without edema, ulcers, or exudates, and 1+ bilaterally.  Cardiovascular: Regular rate and rhythm with S1 equal to S2. No murmurs, friction rubs, or gallops.   Respiratory: Lungs are resonant and clear to auscultation bilaterally. No wheezes, crackles, or rhonchi.  Psych: Appropriate mood and affect.

## 2022-06-30 NOTE — PROGRESS NOTES
"  {PROVIDER CHARTING PREFERENCE:424159}    Live Fishman is a 32 year old{ACCOMPANIED BY STATEMENT (Optional):420102}, presenting for the following health issues:  Cough and Epistaxis      Cough    Epistaxis  Associated symptoms include coughing.   History of Present Illness       Reason for visit:  Cold  Symptom onset:  1-2 weeks ago    He eats 2-3 servings of fruits and vegetables daily.He consumes 1 sweetened beverage(s) daily.He exercises with enough effort to increase his heart rate 30 to 60 minutes per day.  He exercises with enough effort to increase his heart rate 5 days per week. He is missing 7 dose(s) of medications per week.       Acute Illness  Acute illness concerns: mild cough and bloody noses   Symptoms:  Fever: {.:368746::\"no\"}  Chills/Sweats: YES  Headache (location?): YES  Sinus Pressure: YES  Conjunctivitis:  no  Ear Pain: no  Rhinorrhea: YES  Congestion: YES  Sore Throat: no  Cough: YES-productive of green sputum  Wheeze: YES- a little   Decreased Appetite: YES  Nausea: no  Vomiting: no  Diarrhea: no  Dysuria/Freq.: no  Dysuria or Hematuria: no  Fatigue/Achiness: no  Sick/Strep Exposure: no  Therapies tried and outcome: nyquil   {additonal problems for provider to add (Optional):857039}    Review of Systems   HENT: Positive for nosebleeds.    Respiratory: Positive for cough.       {ROS COMP (Optional):684580}      Objective    /82 (BP Location: Right arm, Patient Position: Sitting, Cuff Size: Adult Large)   Pulse 96   Temp 97.3  F (36.3  C) (Tympanic)   Resp 18   Wt 89.4 kg (197 lb)   SpO2 94%   BMI 28.27 kg/m    Body mass index is 28.27 kg/m .  Physical Exam   {Exam List (Optional):217524}    {Diagnostic Test Results (Optional):278156}    {AMBULATORY ATTESTATION (Optional):652060}            .  ..  "

## 2022-07-12 ENCOUNTER — APPOINTMENT (OUTPATIENT)
Dept: OCCUPATIONAL MEDICINE | Facility: OTHER | Age: 32
End: 2022-07-12

## 2022-07-12 PROCEDURE — 99000 SPECIMEN HANDLING OFFICE-LAB: CPT

## 2022-09-04 ENCOUNTER — HEALTH MAINTENANCE LETTER (OUTPATIENT)
Age: 32
End: 2022-09-04

## 2022-11-15 ENCOUNTER — OFFICE VISIT (OUTPATIENT)
Dept: FAMILY MEDICINE | Facility: OTHER | Age: 32
End: 2022-11-15
Attending: FAMILY MEDICINE
Payer: COMMERCIAL

## 2022-11-15 VITALS
SYSTOLIC BLOOD PRESSURE: 112 MMHG | DIASTOLIC BLOOD PRESSURE: 68 MMHG | WEIGHT: 197 LBS | OXYGEN SATURATION: 97 % | HEIGHT: 71 IN | BODY MASS INDEX: 27.58 KG/M2 | HEART RATE: 86 BPM | TEMPERATURE: 97.6 F

## 2022-11-15 DIAGNOSIS — F41.9 ANXIETY: ICD-10-CM

## 2022-11-15 DIAGNOSIS — Z82.49 FAMILY HISTORY OF ISCHEMIC HEART DISEASE: Primary | ICD-10-CM

## 2022-11-15 DIAGNOSIS — Z00.00 ROUTINE GENERAL MEDICAL EXAMINATION AT A HEALTH CARE FACILITY: ICD-10-CM

## 2022-11-15 LAB
ALBUMIN SERPL BCG-MCNC: 4.8 G/DL (ref 3.5–5.2)
ALP SERPL-CCNC: 63 U/L (ref 40–129)
ALT SERPL W P-5'-P-CCNC: 35 U/L (ref 10–50)
ANION GAP SERPL CALCULATED.3IONS-SCNC: 14 MMOL/L (ref 7–15)
AST SERPL W P-5'-P-CCNC: 29 U/L (ref 10–50)
BASOPHILS # BLD AUTO: 0 10E3/UL (ref 0–0.2)
BASOPHILS NFR BLD AUTO: 1 %
BILIRUB SERPL-MCNC: 1.2 MG/DL
BUN SERPL-MCNC: 15.8 MG/DL (ref 6–20)
CALCIUM SERPL-MCNC: 9.6 MG/DL (ref 8.6–10)
CHLORIDE SERPL-SCNC: 101 MMOL/L (ref 98–107)
CHOLEST SERPL-MCNC: 190 MG/DL
CREAT SERPL-MCNC: 1 MG/DL (ref 0.67–1.17)
DEPRECATED HCO3 PLAS-SCNC: 23 MMOL/L (ref 22–29)
EOSINOPHIL # BLD AUTO: 0.1 10E3/UL (ref 0–0.7)
EOSINOPHIL NFR BLD AUTO: 2 %
ERYTHROCYTE [DISTWIDTH] IN BLOOD BY AUTOMATED COUNT: 12.6 % (ref 10–15)
GFR SERPL CREATININE-BSD FRML MDRD: >90 ML/MIN/1.73M2
GLUCOSE SERPL-MCNC: 89 MG/DL (ref 70–99)
HCT VFR BLD AUTO: 44.9 % (ref 40–53)
HDLC SERPL-MCNC: 51 MG/DL
HGB BLD-MCNC: 15.3 G/DL (ref 13.3–17.7)
LDLC SERPL CALC-MCNC: 123 MG/DL
LYMPHOCYTES # BLD AUTO: 1.7 10E3/UL (ref 0.8–5.3)
LYMPHOCYTES NFR BLD AUTO: 33 %
MCH RBC QN AUTO: 29.4 PG (ref 26.5–33)
MCHC RBC AUTO-ENTMCNC: 34.1 G/DL (ref 31.5–36.5)
MCV RBC AUTO: 86 FL (ref 78–100)
MONOCYTES # BLD AUTO: 0.8 10E3/UL (ref 0–1.3)
MONOCYTES NFR BLD AUTO: 14 %
NEUTROPHILS # BLD AUTO: 2.7 10E3/UL (ref 1.6–8.3)
NEUTROPHILS NFR BLD AUTO: 51 %
NONHDLC SERPL-MCNC: 139 MG/DL
PLATELET # BLD AUTO: 280 10E3/UL (ref 150–450)
POTASSIUM SERPL-SCNC: 4.3 MMOL/L (ref 3.4–5.3)
PROT SERPL-MCNC: 8.2 G/DL (ref 6.4–8.3)
RBC # BLD AUTO: 5.2 10E6/UL (ref 4.4–5.9)
SODIUM SERPL-SCNC: 138 MMOL/L (ref 136–145)
TRIGL SERPL-MCNC: 79 MG/DL
WBC # BLD AUTO: 5.3 10E3/UL (ref 4–11)

## 2022-11-15 PROCEDURE — 99395 PREV VISIT EST AGE 18-39: CPT | Performed by: FAMILY MEDICINE

## 2022-11-15 PROCEDURE — 85025 COMPLETE CBC W/AUTO DIFF WBC: CPT | Performed by: FAMILY MEDICINE

## 2022-11-15 PROCEDURE — 80061 LIPID PANEL: CPT | Performed by: FAMILY MEDICINE

## 2022-11-15 PROCEDURE — 36415 COLL VENOUS BLD VENIPUNCTURE: CPT | Performed by: FAMILY MEDICINE

## 2022-11-15 PROCEDURE — 80053 COMPREHEN METABOLIC PANEL: CPT | Performed by: FAMILY MEDICINE

## 2022-11-15 RX ORDER — TRAZODONE HYDROCHLORIDE 50 MG/1
50 TABLET, FILM COATED ORAL AT BEDTIME
Qty: 90 TABLET | Refills: 1 | Status: SHIPPED | OUTPATIENT
Start: 2022-11-15 | End: 2024-01-05

## 2022-11-15 ASSESSMENT — PAIN SCALES - GENERAL: PAINLEVEL: NO PAIN (0)

## 2022-11-15 NOTE — NURSING NOTE
"Chief Complaint   Patient presents with     Physical       Initial /68   Pulse 86   Temp 97.6  F (36.4  C) (Tympanic)   Ht 1.791 m (5' 10.5\")   Wt 89.4 kg (197 lb)   SpO2 97%   BMI 27.87 kg/m   Estimated body mass index is 27.87 kg/m  as calculated from the following:    Height as of this encounter: 1.791 m (5' 10.5\").    Weight as of this encounter: 89.4 kg (197 lb).  Medication Reconciliation: complete  Agueda May LPN  "

## 2022-11-15 NOTE — PROGRESS NOTES
SUBJECTIVE:   CC: Kye is an 32 year old who presents for preventative health visit.         Healthy Habits:     Getting at least 3 servings of Calcium per day:  Yes    Bi-annual eye exam:  Yes    Dental care twice a year:  Yes    Sleep apnea or symptoms of sleep apnea:  None    Diet:  Regular (no restrictions)    Frequency of exercise:  None    Taking medications regularly:  Not Applicable    Medication side effects:  Not applicable    PHQ-2 Total Score: 0    Additional concerns today:  No              Today's PHQ-2 Score:   PHQ-2 ( 1999 Pf[employer]) 11/15/2022   Q1: Little interest or pleasure in doing things 0   Q2: Feeling down, depressed or hopeless 0   PHQ-2 Score 0   Q1: Little interest or pleasure in doing things Not at all   Q2: Feeling down, depressed or hopeless Not at all   PHQ-2 Score 0       Abuse: Current or Past(Physical, Sexual or Emotional)- No  Do you feel safe in your environment? Yes        Social History     Tobacco Use     Smoking status: Never     Smokeless tobacco: Current     Types: Chew     Tobacco comments:     declined quit plan 1/24/2020   Substance Use Topics     Alcohol use: Yes     Comment: social     If you drink alcohol do you typically have >3 drinks per day or >7 drinks per week? No    Alcohol Use 11/15/2022   Prescreen: >3 drinks/day or >7 drinks/week? No       Last PSA: No results found for: PSA    Reviewed orders with patient. Reviewed health maintenance and updated orders accordingly -   Lab work is in process    Reviewed and updated as needed this visit by clinical staff   Tobacco  Allergies  Meds              Reviewed and updated as needed this visit by Provider                 Past Medical History:   Diagnosis Date     Bronchitis, not specified as acute or chronic 3/1/2000      No past surgical history on file.    Review of Systems  CONSTITUTIONAL: NEGATIVE for fever, chills, change in weight  INTEGUMENTARY/SKIN: NEGATIVE for worrisome rashes, moles or lesions  EYES:  "NEGATIVE for vision changes or irritation  ENT: NEGATIVE for ear, mouth and throat problems  RESP: NEGATIVE for significant cough or SOB  CV: NEGATIVE for chest pain, palpitations or peripheral edema  GI: NEGATIVE for nausea, abdominal pain, heartburn, or change in bowel habits   male: negative for dysuria, hematuria, decreased urinary stream, erectile dysfunction, urethral discharge  MUSCULOSKELETAL: NEGATIVE for significant arthralgias or myalgia  NEURO: NEGATIVE for weakness, dizziness or paresthesias  PSYCHIATRIC: NEGATIVE for changes in mood or affect    OBJECTIVE:   /68   Pulse 86   Temp 97.6  F (36.4  C) (Tympanic)   Ht 1.791 m (5' 10.5\")   Wt 89.4 kg (197 lb)   SpO2 97%   BMI 27.87 kg/m      Physical Exam  GENERAL: healthy, alert and no distress  EYES: Eyes grossly normal to inspection, PERRL and conjunctivae and sclerae normal  HENT: ear canals and TM's normal, nose and mouth without ulcers or lesions  NECK: no adenopathy, no asymmetry, masses, or scars and thyroid normal to palpation  RESP: lungs clear to auscultation - no rales, rhonchi or wheezes  CV: regular rate and rhythm, normal S1 S2, no S3 or S4, no murmur, click or rub, no peripheral edema and peripheral pulses strong  ABDOMEN: soft, nontender, no hepatosplenomegaly, no masses and bowel sounds normal   (male): normal male genitalia without lesions or urethral discharge, no hernia  MS: no gross musculoskeletal defects noted, no edema  SKIN: no suspicious lesions or rashes  NEURO: Normal strength and tone, mentation intact and speech normal  PSYCH: mentation appears normal, affect normal/bright    Diagnostic Test Results:  Labs reviewed in Epic    ASSESSMENT/PLAN:       ICD-10-CM    1. Family history of ischemic heart disease  Z82.49 CBC with Platelets & Differential     Comprehensive metabolic panel     Lipid Profile     CBC with Platelets & Differential     Comprehensive metabolic panel     Lipid Profile      2. Routine general " "medical examination at a health care facility  Z00.00       3. Anxiety  F41.9 traZODone (DESYREL) 50 MG tablet                COUNSELING:   Reviewed preventive health counseling, as reflected in patient instructions    Estimated body mass index is 27.87 kg/m  as calculated from the following:    Height as of this encounter: 1.791 m (5' 10.5\").    Weight as of this encounter: 89.4 kg (197 lb).         He reports that he has never smoked. His smokeless tobacco use includes chew.      Counseling Resources:  ATP IV Guidelines  Pooled Cohorts Equation Calculator  FRAX Risk Assessment  ICSI Preventive Guidelines  Dietary Guidelines for Americans, 2010  USDA's MyPlate  ASA Prophylaxis  Lung CA Screening    Chivo Kemp MD  [facility]  "

## 2022-11-15 NOTE — LETTER
November 17, 2022      Kye Barlow  [address] [place] [address]        Dear ,    We are writing to inform you of your test results.    Your test results fall within the expected range(s) or remain unchanged from previous results.  Please continue with current treatment plan.  Recheck annually.     Resulted Orders   Comprehensive metabolic panel   Result Value Ref Range    Sodium 138 136 - 145 mmol/L    Potassium 4.3 3.4 - 5.3 mmol/L    Chloride 101 98 - 107 mmol/L    Carbon Dioxide (CO2) 23 22 - 29 mmol/L    Anion Gap 14 7 - 15 mmol/L    Urea Nitrogen 15.8 6.0 - 20.0 mg/dL    Creatinine 1.00 0.67 - 1.17 mg/dL    Calcium 9.6 8.6 - 10.0 mg/dL    Glucose 89 70 - 99 mg/dL    Alkaline Phosphatase 63 40 - 129 U/L    AST 29 10 - 50 U/L    ALT 35 10 - 50 U/L    Protein Total 8.2 6.4 - 8.3 g/dL    Albumin 4.8 3.5 - 5.2 g/dL    Bilirubin Total 1.2 <=1.2 mg/dL    GFR Estimate >90 >60 mL/min/1.73m2      Comment:      Effective December 21, 2021 eGFRcr in adults is calculated using the 2021 CKD-EPI creatinine equation which includes age and gender (Rosendo et al., NEJM, DOI: 10.1056/XSWCqo3213173)   Lipid Profile   Result Value Ref Range    Cholesterol 190 <200 mg/dL    Triglycerides 79 <150 mg/dL    Direct Measure HDL 51 >=40 mg/dL    LDL Cholesterol Calculated 123 (H) <=100 mg/dL    Non HDL Cholesterol 139 (H) <130 mg/dL    Narrative    Cholesterol  Desirable:  <200 mg/dL    Triglycerides  Normal:  Less than 150 mg/dL  Borderline High:  150-199 mg/dL  High:  200-499 mg/dL  Very High:  Greater than or equal to 500 mg/dL    Direct Measure HDL  Female:  Greater than or equal to 50 mg/dL   Male:  Greater than or equal to 40 mg/dL    LDL Cholesterol  Desirable:  <100mg/dL  Above Desirable:  100-129 mg/dL   Borderline High:  130-159 mg/dL   High:  160-189 mg/dL   Very High:  >= 190 mg/dL    Non HDL Cholesterol  Desirable:  130 mg/dL  Above Desirable:  130-159 mg/dL  Borderline High:  160-189 mg/dL  High:   190-219 mg/dL  Very High:  Greater than or equal to 220 mg/dL   CBC with platelets and differential   Result Value Ref Range    WBC Count 5.3 4.0 - 11.0 10e3/uL    RBC Count 5.20 4.40 - 5.90 10e6/uL    Hemoglobin 15.3 13.3 - 17.7 g/dL    Hematocrit 44.9 40.0 - 53.0 %    MCV 86 78 - 100 fL    MCH 29.4 26.5 - 33.0 pg    MCHC 34.1 31.5 - 36.5 g/dL    RDW 12.6 10.0 - 15.0 %    Platelet Count 280 150 - 450 10e3/uL    % Neutrophils 51 %    % Lymphocytes 33 %    % Monocytes 14 %    % Eosinophils 2 %    % Basophils 1 %    Absolute Neutrophils 2.7 1.6 - 8.3 10e3/uL    Absolute Lymphocytes 1.7 0.8 - 5.3 10e3/uL    Absolute Monocytes 0.8 0.0 - 1.3 10e3/uL    Absolute Eosinophils 0.1 0.0 - 0.7 10e3/uL    Absolute Basophils 0.0 0.0 - 0.2 10e3/uL       If you have any questions or concerns, please call the clinic at the number listed above.       Sincerely,      Chivo Kemp MD

## 2023-06-14 DIAGNOSIS — Z31.89 ENCOUNTER FOR ARTIFICIAL INSEMINATION: Primary | ICD-10-CM

## 2023-06-14 NOTE — PROGRESS NOTES
Patient called to consent to chart access by OB/Gyn team for fertility related orders. Semen wash ordered for upcoming IUI.    Alexus Harvey RN...................6/14/2023 2:10 PM

## 2023-06-27 ENCOUNTER — LAB (OUTPATIENT)
Dept: LAB | Facility: OTHER | Age: 33
End: 2023-06-27
Attending: OBSTETRICS & GYNECOLOGY
Payer: COMMERCIAL

## 2023-06-27 DIAGNOSIS — Z31.89 ENCOUNTER FOR ARTIFICIAL INSEMINATION: ICD-10-CM

## 2023-06-27 LAB
POST WASH COUNT: 66 MILLION/ML
POST WASH MOTILITY: 75 %
PRE WASH COUNT: 46 MILLION/ML
PRE WASH MOTILITY: 80 %

## 2023-06-27 PROCEDURE — 89310 SEMEN ANALYSIS W/COUNT: CPT | Mod: ZL

## 2023-07-17 DIAGNOSIS — Z31.89 ENCOUNTER FOR ARTIFICIAL INSEMINATION: Primary | ICD-10-CM

## 2023-07-17 NOTE — PROGRESS NOTES
Lab requested orders for upcoming appointment for IUI. Orders placed.  Tanesha Morataya RN on 7/17/2023 at 2:09 PM

## 2023-08-16 DIAGNOSIS — Z31.89 ENCOUNTER FOR ARTIFICIAL INSEMINATION: Primary | ICD-10-CM

## 2023-10-24 ENCOUNTER — LAB (OUTPATIENT)
Dept: LAB | Facility: OTHER | Age: 33
End: 2023-10-24
Attending: OBSTETRICS & GYNECOLOGY
Payer: COMMERCIAL

## 2023-10-24 DIAGNOSIS — Z31.89 ENCOUNTER FOR ARTIFICIAL INSEMINATION: ICD-10-CM

## 2023-10-24 LAB
POST WASH COUNT: 27 MILLION/ML
POST WASH MOTILITY: 45 %
PRE WASH COUNT: 19 MILLION/ML
PRE WASH MOTILITY: 38 %

## 2023-10-24 PROCEDURE — 89310 SEMEN ANALYSIS W/COUNT: CPT | Mod: ZL

## 2024-01-05 ENCOUNTER — OFFICE VISIT (OUTPATIENT)
Dept: FAMILY MEDICINE | Facility: OTHER | Age: 34
End: 2024-01-05
Attending: FAMILY MEDICINE
Payer: COMMERCIAL

## 2024-01-05 ENCOUNTER — HOSPITAL ENCOUNTER (OUTPATIENT)
Dept: GENERAL RADIOLOGY | Facility: OTHER | Age: 34
Discharge: HOME | End: 2024-01-05
Attending: FAMILY MEDICINE
Payer: COMMERCIAL

## 2024-01-05 VITALS
HEART RATE: 96 BPM | OXYGEN SATURATION: 98 % | RESPIRATION RATE: 18 BRPM | WEIGHT: 194 LBS | HEIGHT: 71 IN | BODY MASS INDEX: 27.16 KG/M2 | DIASTOLIC BLOOD PRESSURE: 82 MMHG | SYSTOLIC BLOOD PRESSURE: 128 MMHG | TEMPERATURE: 97.2 F

## 2024-01-05 DIAGNOSIS — M79.601 PAIN OF RIGHT UPPER EXTREMITY: ICD-10-CM

## 2024-01-05 DIAGNOSIS — M54.12 CERVICAL RADICULOPATHY: Primary | ICD-10-CM

## 2024-01-05 PROCEDURE — 73030 X-RAY EXAM OF SHOULDER: CPT | Mod: RT

## 2024-01-05 PROCEDURE — 99213 OFFICE O/P EST LOW 20 MIN: CPT | Performed by: FAMILY MEDICINE

## 2024-01-05 PROCEDURE — 72040 X-RAY EXAM NECK SPINE 2-3 VW: CPT

## 2024-01-05 RX ORDER — METHYLPREDNISOLONE 4 MG
TABLET, DOSE PACK ORAL
Qty: 21 TABLET | Refills: 0 | Status: SHIPPED | OUTPATIENT
Start: 2024-01-05 | End: 2024-08-05

## 2024-01-05 ASSESSMENT — PAIN SCALES - GENERAL: PAINLEVEL: EXTREME PAIN (8)

## 2024-01-05 NOTE — NURSING NOTE
Patient is here for right arm pain. Has been about 2 months. Radiating into neck, and down arm into hand and fingers.       Medication Reconciliation: complete    Camilal Gordillo LPN 1/5/2024 9:55 AM       Advance care directive on file? no  Advance care directive provided to patient? no       Camilla Gordillo LPN

## 2024-01-05 NOTE — PROGRESS NOTES
"  Assessment & Plan     Cervical radiculopathy    Normal physical exam.  X-rays of the cervical spine and right shoulder were obtained and were negative.  No early onset arthritic changes.  Most consistent with cervical radiculopathy.  Less likely shoulder impingement.    Recommend Medrol Dosepak as ordered.  He will update me in 7 to 10 days.  Consider advanced imaging if symptoms have not improved.  Could also consider referral to orthopedics.  Pain of right upper extremity    - XR Cervical Spine 2/3 Views; Future  - XR Shoulder Right G/E 3 Views; Future  - methylPREDNISolone (MEDROL DOSEPAK) 4 MG tablet therapy pack; Follow Package Directions             BMI:   Estimated body mass index is 27.06 kg/m  as calculated from the following:    Height as of this encounter: 1.803 m (5' 11\").    Weight as of this encounter: 88 kg (194 lb).           No follow-ups on file.    Chichi Resendez MD  [facility]    Live Fishman is a 33 year old, presenting for the following health issues:  Arm Pain (Right )    33-year-old healthy male presents with 1 month history of right sided neck, posterior shoulder discomfort that radiates into his arm. 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    Describes it as a constant burning sensation that is worse with certain activities.  It is bothering him at night and impacting his sleep.  Denies weakness in the hand or arm.  Paresthesias extend into the ulnar distribution.    History of Present Illness       Reason for visit:  Shoulder pain  Symptom onset:  More than a month  Symptoms include:  Burning, numbness,radiating pain down arm  Symptom intensity:  Severe  Symptom progression:  Worsening  Had these symptoms before:  No    He eats 0-1 servings of fruits and vegetables daily.He consumes 1 sweetened beverage(s) daily.He exercises with enough effort to increase his heart rate 60 or more minutes per day.  He exercises with enough " "effort to increase his heart rate 5 days per week.   He is taking medications regularly.                 Review of Systems   Constitutional, HEENT, cardiovascular, pulmonary, gi and gu systems are negative, except as otherwise noted.      Objective    /82   Pulse 96   Temp 97.2  F (36.2  C) (Tympanic)   Resp 18   Ht 1.803 m (5' 11\")   Wt 88 kg (194 lb)   SpO2 98%   BMI 27.06 kg/m    Body mass index is 27.06 kg/m .  Physical Exam  Musculoskeletal:      Right shoulder: Normal. No swelling, deformity, effusion, laceration, tenderness, bony tenderness or crepitus. Normal range of motion. Normal strength. Normal pulse.      Cervical back: No swelling, deformity, signs of trauma, spasms or tenderness. Normal range of motion.      Comments: Biceps tendon NT  Negative impingement signs   Neurological:      Motor: Motor function is intact. No weakness.      Deep Tendon Reflexes:      Reflex Scores:       Tricep reflexes are 1+ on the right side and 1+ on the left side.       Bicep reflexes are 1+ on the right side and 1+ on the left side.           Results for orders placed or performed during the hospital encounter of 01/05/24   XR Shoulder Right G/E 3 Views     Status: None    Narrative    PROCEDURE:  XR SHOULDER RIGHT G/E 3 VIEWS    HISTORY: Pain of right upper extremity    COMPARISON: No relevant priors available for comparison    TECHNIQUE:  XR SHOULDER RIGHT 3 VIEWS    FINDINGS:   No acute fracture or dislocation is identified. No suspicious osseous  lesion. The joint spaces are preserved. Mild enthesopathy at the  rotator cuff footplate.    No foreign body or subcutaneous emphysema.        Impression    IMPRESSION:   No acute osseous abnormality.    GIULIANA SÁNCHEZ MD         SYSTEM ID:  [id number]   Results for orders placed or performed during the hospital encounter of 01/05/24   XR Cervical Spine 2/3 Views     Status: None    Narrative    PROCEDURE: XR CERVICAL SPINE 2/3 VIEWS    HISTORY: Pain of right " upper extremity    COMPARISON: None.    TECHNIQUE: AP, lateral, and odontoid views of the cervical spine were  obtained.    FINDINGS: No acute fracture or subluxation is seen. The cervical  vertebrae are normally aligned. There is no disc height narrowing at  any level. Normal lordotic curvature of the cervical spine. No  significant facet hypertrophy.     The odontoid is intact.     Prevertebral soft tissues are within normal limits. Lung apices are  clear.      Impression    IMPRESSION: Negative radiographs of the cervical spine.     GIULIANA SÁNCHEZ MD         SYSTEM ID:  [id number]

## 2024-01-18 ENCOUNTER — OFFICE VISIT (OUTPATIENT)
Dept: FAMILY MEDICINE | Facility: OTHER | Age: 34
End: 2024-01-18
Attending: FAMILY MEDICINE
Payer: COMMERCIAL

## 2024-01-18 VITALS
DIASTOLIC BLOOD PRESSURE: 80 MMHG | HEART RATE: 80 BPM | BODY MASS INDEX: 27.17 KG/M2 | RESPIRATION RATE: 16 BRPM | WEIGHT: 194.8 LBS | SYSTOLIC BLOOD PRESSURE: 122 MMHG | TEMPERATURE: 98.4 F | OXYGEN SATURATION: 98 %

## 2024-01-18 DIAGNOSIS — M54.12 CERVICAL RADICULOPATHY: ICD-10-CM

## 2024-01-18 DIAGNOSIS — S46.111A STRAIN OF LONG HEAD OF RIGHT BICEPS: Primary | ICD-10-CM

## 2024-01-18 PROCEDURE — 99214 OFFICE O/P EST MOD 30 MIN: CPT | Performed by: FAMILY MEDICINE

## 2024-01-18 ASSESSMENT — PAIN SCALES - GENERAL: PAINLEVEL: MODERATE PAIN (5)

## 2024-01-18 NOTE — PROGRESS NOTES
/Sports Medicine Office Note    HPI:  33-year-old male coming in for evaluation of right shoulder pain.  He noticed the pain started at the beginning of November 2023.  He was pulling back his bow on a deer and felt a strain in his bicep area.  Over the subsequent weeks he began to develop pain in the posterior aspect of the shoulder with a burning sensation extending down his arm into the lateral elbow and ulnar 2 digits of the hand.  He has been working with a chiropractor who has helped to alleviate a lot of this radicular symptomatology.  He is currently endorsing an intermittent pain in the anterolateral aspect of the shoulder.  He rates his pain at 5/10.  He characterized the pain as sharp, aching, and burning.  Certain movements will cause the sharp pain, he has difficulty identifying specific maneuvers.  He has been using heat, ice, and stretching.  He was seen by primary care on 1/5.  X-rays of the C-spine and shoulder were performed and were negative for significant abnormalities.  He has never had problems with the shoulder before.      EXAM:  /80 (BP Location: Right arm, Patient Position: Sitting, Cuff Size: Adult Regular)   Pulse 80   Temp 98.4  F (36.9  C) (Temporal)   Resp 16   Wt 88.4 kg (194 lb 12.8 oz)   SpO2 98%   BMI 27.17 kg/m    MUSCULOSKELETAL EXAM:  RIGHT SHOULDER  Inspection:  -No gross deformity  -No bruising or swelling  -Scars:  None    Tenderness to palpation of the:  -SC joint:  Negative  -AC joint:   Negative  -Clavicle:   Negative  -Biceps tendon in bicipital groove:   Mild pain  -Deltoid musculature:   Negative  -Upper trapezius musculature:   Negative    Range of Motion:  -Active flexion:  180 left, 180 right  -Active abduction:  180 left, 180 right    Strength:  -Supraspinatus:  5/5  -Infraspinatus:  5/5  -Subscapularis:  5/5  -Deltoid:  5/5  -Biceps:  5/5    Special Tests:  -Wicho test:  Negative  -Long test:   Negative  -Neer test:   Negative  -Mid-arc pain:  Absent  -Speeds test:   Negative  -[place] active compression test:   Weakly positive  -Crossarm adduction test:   Negative  -Yergason test:   Negative  -Lag sign:   Negative  -Scapular dyskinesis:  Present, worse on the left  -Scapular assist test:   Negative    Other:  -Intact sensation to light touch distally.  -No signs of cyanosis. Normal skin temperature of the upper extremity.  -Elbow:  No gross deformity. Full range of motion.  -Hand/wrist:  No gross deformity. Full range of motion.  -Left shoulder:  No gross deformity. No palpable tenderness. Normal strength.    THORACIC OUTLET SYNDROME SPECIAL TESTS:  Adsen test:  Negative  Supraclavicular pressure test:  Negative  Costoclavicular ( brace) maneuver:  Negative  Grissom test:  Negative  Sherwin test:  Negative  Upper limb tension test:  Negative      IMAGIN2024: 3 view right shoulder x-ray  - No fracture, dislocation, or bony lesion    2024: 3 view C-spine x-ray  - Normal lordotic curvature.  No significant facet hypertrophy.  No listhesis.  No acute bony abnormalities.      ASSESSMENT/PLAN:  Diagnoses and all orders for this visit:  Strain of long head of right biceps  -     Orthopedic  Referral  Cervical radiculopathy  -     Orthopedic  Referral    33-year-old male with what appears to be a strain to the biceps tendon.  X-rays from  were personally reviewed in the office with the findings as demonstrated above by my interpretation.  As the symptoms do seem to be adequately controlled and improving at this time, we discussed a more conservative treatment approach.  - Reassurance provided  - Expectant management  - Handout given with home exercises for biceps strain  - If symptoms do not continue to improve over the next 1-2 months, patient can come back in for reevaluation and we can consider possible formal PT versus advanced imaging at that time      Ildefonso Canela MD  2024  7:46 AM    Total time spent with this  patient was 34 minutes which included chart review, visualization and independent interpretation of images, time spent with the patient, and documentation.    Procedure time:  0 minute(s)

## 2024-01-18 NOTE — NURSING NOTE
Patient is here today for right arm/shoulder pain consult  Started around 11/1/23  from pulling back bow

## 2024-02-17 ENCOUNTER — HEALTH MAINTENANCE LETTER (OUTPATIENT)
Age: 34
End: 2024-02-17

## 2024-07-31 ENCOUNTER — APPOINTMENT (OUTPATIENT)
Dept: OCCUPATIONAL MEDICINE | Facility: OTHER | Age: 34
End: 2024-07-31

## 2024-07-31 PROCEDURE — 99000 SPECIMEN HANDLING OFFICE-LAB: CPT

## 2024-08-05 ENCOUNTER — TELEPHONE (OUTPATIENT)
Dept: FAMILY MEDICINE | Facility: OTHER | Age: 34
End: 2024-08-05

## 2024-08-05 ENCOUNTER — OFFICE VISIT (OUTPATIENT)
Dept: FAMILY MEDICINE | Facility: OTHER | Age: 34
End: 2024-08-05
Attending: FAMILY MEDICINE
Payer: COMMERCIAL

## 2024-08-05 VITALS
SYSTOLIC BLOOD PRESSURE: 110 MMHG | DIASTOLIC BLOOD PRESSURE: 68 MMHG | BODY MASS INDEX: 26.78 KG/M2 | HEART RATE: 105 BPM | OXYGEN SATURATION: 98 % | TEMPERATURE: 97.7 F | WEIGHT: 192 LBS

## 2024-08-05 DIAGNOSIS — M25.511 CHRONIC RIGHT SHOULDER PAIN: Primary | ICD-10-CM

## 2024-08-05 DIAGNOSIS — F41.9 ANXIETY: ICD-10-CM

## 2024-08-05 DIAGNOSIS — G89.29 CHRONIC RIGHT SHOULDER PAIN: Primary | ICD-10-CM

## 2024-08-05 PROCEDURE — 99214 OFFICE O/P EST MOD 30 MIN: CPT | Performed by: FAMILY MEDICINE

## 2024-08-05 PROCEDURE — G2211 COMPLEX E/M VISIT ADD ON: HCPCS | Performed by: FAMILY MEDICINE

## 2024-08-05 RX ORDER — ESCITALOPRAM OXALATE 20 MG/1
20 TABLET ORAL DAILY
Qty: 90 TABLET | Refills: 3 | Status: SHIPPED | OUTPATIENT
Start: 2024-08-05

## 2024-08-05 ASSESSMENT — PAIN SCALES - GENERAL: PAINLEVEL: SEVERE PAIN (7)

## 2024-08-05 NOTE — TELEPHONE ENCOUNTER
Attempt # 1  Outcome: Left Message   Comment: LVM to see if pt can come in at 1:45 today with Dr Kemp.

## 2024-08-05 NOTE — TELEPHONE ENCOUNTER
7:45 AM    Reason for Call: OVERBOOK    Patient is having the following symptoms: right shoulder looked at for a potential MRI. Previous injury that hasn t gotten any better since last November. Wants anxiety meds changed.     Patient scheduled on [facility] but wouldn't be able to be seen until end of October.       Preferred method for responding to this message: Telephone Call  What is your phone number [phone]    If we cannot reach you directly, may we leave a detailed response at the number you provided Yes        Leslie Knapp

## 2024-08-05 NOTE — PROGRESS NOTES
"  Assessment & Plan     Chronic right shoulder pain  Ongoing, nearly a year.  Tried home PT, oral steroid burst.  Old bowhunting overuse injury from last fall.  MRI is indicated as pain is increasing.  Xray already done this winter without bony abnormality.    - MR Shoulder Right w/o Contrast; Future    Anxiety  Nice review.  No help from the lexapro in the past at 10 mg.  No side effects.  Trazodone makes him too tired.  Plan titrate lexapro to 20 mg and see him back 2-3 months.    - escitalopram (LEXAPRO) 20 MG tablet; Take 1 tablet (20 mg) by mouth daily          BMI  Estimated body mass index is 26.78 kg/m  as calculated from the following:    Height as of 1/5/24: 1.803 m (5' 11\").    Weight as of this encounter: 87.1 kg (192 lb).             No follow-ups on file.    Live Fishman is a 34 year old, presenting for the following health issues:  Musculoskeletal Problem        8/5/2024     1:32 PM   Additional Questions   Roomed by Agueda   Accompanied by self     HPI     Musculoskeletal problem/pain    Duration: November 2023  Description  Location: right shoulder   Intensity:  moderate  Accompanying signs and symptoms: numbness and tingling  History  Previous similar problem: YES  Previous evaluation:  x-ray  Precipitating or alleviating factors:  Trauma or overuse: YES- bowhunting   Aggravating factors include: lifting and overuse  Therapies tried and outcome: nothing     Anxiety   How are you doing with your anxiety since your last visit? Worsened   Are you having other symptoms that might be associated with anxiety? Yes:  more stress, paranoid, more anxious and nervous   Have you had a significant life event? OTHER: recent baby     Are you feeling depressed? No  Do you have any concerns with your use of alcohol or other drugs? No    Social History     Tobacco Use    Smoking status: Never     Passive exposure: Never    Smokeless tobacco: Current     Types: Chew    Tobacco comments:     declined quit plan " 1/24/2020   Vaping Use    Vaping status: Never Used   Substance Use Topics    Alcohol use: Yes     Comment: social    Drug use: No         10/23/2017     2:19 PM 1/24/2020     9:00 AM 4/22/2021    11:00 AM   CRIS-7 SCORE   Total Score 7 8 10         1/24/2020     9:00 AM 3/2/2020     1:00 PM 4/22/2021    11:00 AM   PHQ   PHQ-9 Total Score 10 1 3   Q9: Thoughts of better off dead/self-harm past 2 weeks Not at all Not at all Not at all             Review of Systems  Constitutional, HEENT, cardiovascular, pulmonary, gi and gu systems are negative, except as otherwise noted.      Objective    /68   Pulse 105   Temp 97.7  F (36.5  C) (Tympanic)   Wt 87.1 kg (192 lb)   SpO2 98%   BMI 26.78 kg/m    Body mass index is 26.78 kg/m .  Physical Exam   GENERAL: alert and no distress  NECK: no adenopathy, no asymmetry, masses, or scars  RESP: lungs clear to auscultation - no rales, rhonchi or wheezes  CV: regular rate and rhythm, normal S1 S2, no S3 or S4, no murmur, click or rub, no peripheral edema  ABDOMEN: soft, nontender, no hepatosplenomegaly, no masses and bowel sounds normal  MS: pain with abduction of the shoulder.    Psych:  normal range of emotions displayed.     The longitudinal plan of care for the diagnosis(es)/condition(s) as documented were addressed during this visit. Due to the added complexity in care, I will continue to support Kye in the subsequent management and with ongoing continuity of care.        Signed Electronically by: Chivo Kemp MD

## 2024-08-12 ENCOUNTER — HOSPITAL ENCOUNTER (OUTPATIENT)
Dept: MRI IMAGING | Facility: HOSPITAL | Age: 34
Discharge: HOME | End: 2024-08-12
Attending: FAMILY MEDICINE | Admitting: FAMILY MEDICINE
Payer: COMMERCIAL

## 2024-08-12 DIAGNOSIS — M25.511 CHRONIC RIGHT SHOULDER PAIN: ICD-10-CM

## 2024-08-12 DIAGNOSIS — G89.29 CHRONIC RIGHT SHOULDER PAIN: ICD-10-CM

## 2024-08-12 DIAGNOSIS — G89.29 CHRONIC RIGHT SHOULDER PAIN: Primary | ICD-10-CM

## 2024-08-12 DIAGNOSIS — M25.511 CHRONIC RIGHT SHOULDER PAIN: Primary | ICD-10-CM

## 2024-08-12 PROCEDURE — 73221 MRI JOINT UPR EXTREM W/O DYE: CPT | Mod: RT

## 2024-09-03 ENCOUNTER — OFFICE VISIT (OUTPATIENT)
Dept: ORTHOPEDICS | Facility: OTHER | Age: 34
End: 2024-09-03
Attending: FAMILY MEDICINE
Payer: COMMERCIAL

## 2024-09-03 VITALS
SYSTOLIC BLOOD PRESSURE: 108 MMHG | RESPIRATION RATE: 18 BRPM | BODY MASS INDEX: 25.9 KG/M2 | TEMPERATURE: 97.1 F | OXYGEN SATURATION: 97 % | HEART RATE: 89 BPM | HEIGHT: 71 IN | DIASTOLIC BLOOD PRESSURE: 64 MMHG | WEIGHT: 185 LBS

## 2024-09-03 DIAGNOSIS — G89.29 CHRONIC RIGHT SHOULDER PAIN: ICD-10-CM

## 2024-09-03 DIAGNOSIS — M25.511 CHRONIC RIGHT SHOULDER PAIN: ICD-10-CM

## 2024-09-03 DIAGNOSIS — M71.9 DISORDER OF BURSAE AND TENDONS IN SHOULDER REGION: Primary | ICD-10-CM

## 2024-09-03 DIAGNOSIS — M67.919 DISORDER OF BURSAE AND TENDONS IN SHOULDER REGION: Primary | ICD-10-CM

## 2024-09-03 PROCEDURE — 99203 OFFICE O/P NEW LOW 30 MIN: CPT | Mod: 25 | Performed by: ORTHOPAEDIC SURGERY

## 2024-09-03 PROCEDURE — 20610 DRAIN/INJ JOINT/BURSA W/O US: CPT | Mod: RT | Performed by: ORTHOPAEDIC SURGERY

## 2024-09-03 RX ORDER — LIDOCAINE HYDROCHLORIDE 10 MG/ML
2 INJECTION, SOLUTION EPIDURAL; INFILTRATION; INTRACAUDAL; PERINEURAL ONCE
Status: COMPLETED | OUTPATIENT
Start: 2024-09-03 | End: 2024-09-03

## 2024-09-03 RX ORDER — BETAMETHASONE SODIUM PHOSPHATE AND BETAMETHASONE ACETATE 3; 3 MG/ML; MG/ML
12 INJECTION, SUSPENSION INTRA-ARTICULAR; INTRALESIONAL; INTRAMUSCULAR; SOFT TISSUE ONCE
Status: COMPLETED | OUTPATIENT
Start: 2024-09-03 | End: 2024-09-03

## 2024-09-03 RX ADMIN — BETAMETHASONE SODIUM PHOSPHATE AND BETAMETHASONE ACETATE 12 MG: 3; 3 INJECTION, SUSPENSION INTRA-ARTICULAR; INTRALESIONAL; INTRAMUSCULAR; SOFT TISSUE at 16:14

## 2024-09-03 RX ADMIN — LIDOCAINE HYDROCHLORIDE 2 ML: 10 INJECTION, SOLUTION EPIDURAL; INFILTRATION; INTRACAUDAL; PERINEURAL at 16:14

## 2024-09-03 ASSESSMENT — PAIN SCALES - GENERAL: PAINLEVEL: MODERATE PAIN (5)

## 2024-09-03 NOTE — PROGRESS NOTES
ORTHOPEDIC CLINIC CONSULT    Referred by:     Chief Complaint: Kye Barlow is a 34 year old male who is being seen for   Chief Complaint   Patient presents with    Consult     Chronic right shoulder pain       History of Present Illness:   Patient is a 34-year-old right-hand-dominant male who presents for right chronic shoulder pain.  He notes he has been having discomfort since last November when he was bow hunting and was pulling back draw and he was releasing was in awkward position.  He felt sudden pain in his shoulder as he released.  It was significantly limiting him early but over the next several weeks or so he started to get improved.  Although it settled in about 50% improved not all the way back to full.  He continues to have a pain with particular overhead activities pain over the anterior and lateral aspect occasionally more posterior but more commonly and anterior lateral.  Bothers him still even with trying to draw his bow giving him some discomfort.  He had no other injuries.  He notes he works for [place] [employer] does a lot of heavier activities which also gives him some issues.  Does not have any nighttime pain awakening.  He presents for evaluation as not had any injections or physical therapy.  He has some moderate taken some oral steroids that did not take make him any better or some anti-inflammatories but is not any therapy or injections.    Patient's past medical, surgical, social and family histories reviewed.     Past Medical History:   Diagnosis Date    Bronchitis, not specified as acute or chronic 3/1/2000       No past surgical history on file.    Home Medications:  Prior to Admission medications    Medication Sig Start Date End Date Taking? Authorizing Provider   escitalopram (LEXAPRO) 20 MG tablet Take 1 tablet (20 mg) by mouth daily 8/5/24  Yes Chivo Kemp MD       No Known Allergies    Social History     Occupational History    Not on file   Tobacco Use    Smoking  "status: Never     Passive exposure: Never    Smokeless tobacco: Current     Types: Chew    Tobacco comments:     declined quit plan 1/24/2020   Vaping Use    Vaping status: Never Used   Substance and Sexual Activity    Alcohol use: Yes     Comment: social    Drug use: No    Sexual activity: Yes     Partners: Male       No family history on file.    REVIEW OF SYSTEMS            Physical Exam:    Vitals: /64 (BP Location: Left arm, Patient Position: Sitting, Cuff Size: Adult Regular)   Pulse 89   Temp 97.1  F (36.2  C) (Tympanic)   Resp 18   Ht 1.803 m (5' 11\")   Wt 83.9 kg (185 lb)   SpO2 97%   BMI 25.80 kg/m    BMI= Body mass index is 25.8 kg/m .  Patient is awake alert and oriented cooperative no apparent distress afebrile vital signs stable.  Still retains full range of motion.  Has a slightly positive Neer and Long negative cross-body negative Scott's negative Yergason.  Negative posterior drawer.  Neuro vas intact no gross deformity or atrophy.  Radiographs: X-rays are unremarkable as well as MRI which shows some consistent with some posterior labral tearing and some labral cysts but also a mild tendinosis of the biceps tendon mild degenerative changes AC joint downward sloping in the current process with impingement and rotator cuff but cuff tendinosis.  No full-thickness cuff tears.     Independent visualization of the films was made.         Impression:      ICD-10-CM    1. Disorder of bursae and tendons in shoulder region  M71.9 Large Joint/Bursa injection and/or drainage (Shoulder, Knee)    M67.919 betamethasone acet & sod phos (CELESTONE) injection 12 mg     lidocaine (PF) (XYLOCAINE) 1 % injection 2 mL      2. Chronic right shoulder pain  M25.511 Orthopedic  Referral    G89.29           Plan: Pression plan chronic right shoulder pain has some downsloping acromion symptoms consistent with some impingement and bursitis he also has some changes along the labrum posterior labrum on " MRI with some of its symptoms or not being elicited in that area on examination provocative of her symptoms complaints with this more lateral anterolateral typically.  After lengthy discussion he would like to try something conservative first and see if we can get him some improvement and then if he fails to get enough improvement then discussed more aggressive or surgical interventions.  I would like to first try a subacromial injection and then also may be getting him into physical therapy since he is almost a year out from symptoms.  If he fails to get enough improvement with the subacromial injection the contact but also doing an intra-articular injection.  He is comfortable with this plan.  After injection if he is doing well then we will also then get him engaged in physical therapy.    Procedure: Right shoulder subacromial injection.  The shoulder was then prepped with alcohol and then ChloraPrep for sterile technique injection.  A solution of 2 cc of 1% lidocaine and 2 cc of 6 mix mL Celestone was drawn up under sterile technique.  There was a number ethyl chloride and with sterile technique injection in the subacromial space.  Toller procedure well.  Band-Aid was placed over the injection site.    All of the above pertinent physical exam and imaging modalities findings was reviewed with Kye.    Return to clinic in 1 to 2 weeks weeks.    Further imaging required none    Time spent with evaluation: 30 minutes    Jesus Fountain MD  9/3/2024  4:03 PM

## 2024-10-25 ENCOUNTER — APPOINTMENT (OUTPATIENT)
Dept: OCCUPATIONAL MEDICINE | Facility: OTHER | Age: 34
End: 2024-10-25

## 2024-10-25 PROCEDURE — 99000 SPECIMEN HANDLING OFFICE-LAB: CPT

## 2024-10-31 NOTE — PROGRESS NOTES
"  Assessment & Plan     Anxiety  Improved subtly.  Continue indefinitely.  Side effects at first but none now.  Overall mildly improved and he will continue.      Tick bite of left knee, initial encounter  Deer tick.  Get lyme test in a week or so.  No s/s of tick illness.    - LYME DISEASE TOTAL ANTIBODIES WITH REFLEX TO CONFIRMATION; Future    Chronic right shoulder pain  Discussed.  I explained how this all works, showed him his xray and reviewed the MRI and considerations.  He appreciated that.            BMI  Estimated body mass index is 27.06 kg/m  as calculated from the following:    Height as of 9/3/24: 1.803 m (5' 11\").    Weight as of this encounter: 88 kg (194 lb).             No follow-ups on file.    Live Fishman is a 34 year old, presenting for the following health issues:  Recheck Medication        11/7/2024     8:03 AM   Additional Questions   Roomed by Agueda   Accompanied by self     HPI     Medication Followup of lexapro  Taking Medication as prescribed: yes  Side Effects:  None  Medication Helping Symptoms:  yes    Tick bite     Duration: 2 weeks   Description (location/character/radiation): left knee   Intensity:  mild  Accompanying signs and symptoms: none   History (similar episodes/previous evaluation): None  Precipitating or alleviating factors: deer tick   Therapies tried and outcome: None            Review of Systems  Constitutional, HEENT, cardiovascular, pulmonary, gi and gu systems are negative, except as otherwise noted.      Objective    /70   Pulse 66   Temp 96.9  F (36.1  C) (Tympanic)   Wt 88 kg (194 lb)   SpO2 99%   BMI 27.06 kg/m    Body mass index is 27.06 kg/m .  Physical Exam   GENERAL: alert and no distress  NECK: no adenopathy, no asymmetry, masses, or scars  RESP: lungs clear to auscultation - no rales, rhonchi or wheezes  CV: regular rate and rhythm, normal S1 S2, no S3 or S4, no murmur, click or rub, no peripheral edema  ABDOMEN: soft, nontender, no " hepatosplenomegaly, no masses and bowel sounds normal  MS: no gross musculoskeletal defects noted, no edema  SKIN: tick bite site superior to right knee with no concerning findings.      Lyme test pending.      Xray and MRI reviewed for the shoulder.      The longitudinal plan of care for the diagnosis(es)/condition(s) as documented were addressed during this visit. Due to the added complexity in care, I will continue to support Kye in the subsequent management and with ongoing continuity of care.        Signed Electronically by: Chivo Kemp MD

## 2024-11-07 ENCOUNTER — OFFICE VISIT (OUTPATIENT)
Dept: FAMILY MEDICINE | Facility: OTHER | Age: 34
End: 2024-11-07
Attending: FAMILY MEDICINE
Payer: COMMERCIAL

## 2024-11-07 VITALS
WEIGHT: 194 LBS | HEART RATE: 66 BPM | TEMPERATURE: 96.9 F | BODY MASS INDEX: 27.06 KG/M2 | SYSTOLIC BLOOD PRESSURE: 116 MMHG | DIASTOLIC BLOOD PRESSURE: 70 MMHG | OXYGEN SATURATION: 99 %

## 2024-11-07 DIAGNOSIS — G89.29 CHRONIC RIGHT SHOULDER PAIN: ICD-10-CM

## 2024-11-07 DIAGNOSIS — F41.9 ANXIETY: Primary | ICD-10-CM

## 2024-11-07 DIAGNOSIS — M25.511 CHRONIC RIGHT SHOULDER PAIN: ICD-10-CM

## 2024-11-07 DIAGNOSIS — S80.262A TICK BITE OF LEFT KNEE, INITIAL ENCOUNTER: ICD-10-CM
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
## 2024-11-07 PROCEDURE — 99214 OFFICE O/P EST MOD 30 MIN: CPT | Performed by: FAMILY MEDICINE

## 2024-11-07 PROCEDURE — G2211 COMPLEX E/M VISIT ADD ON: HCPCS | Performed by: FAMILY MEDICINE

## 2024-11-07 ASSESSMENT — PAIN SCALES - GENERAL: PAINLEVEL_OUTOF10: NO PAIN (0)

## 2024-11-21 ENCOUNTER — LAB (OUTPATIENT)
Dept: LAB | Facility: OTHER | Age: 34
End: 2024-11-21
Payer: COMMERCIAL
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
## 2024-11-21 DIAGNOSIS — S80.262A TICK BITE OF LEFT KNEE, INITIAL ENCOUNTER: ICD-10-CM

## 2025-03-09 ENCOUNTER — HEALTH MAINTENANCE LETTER (OUTPATIENT)
Age: 35
End: 2025-03-09

## 2025-05-09 SDOH — HEALTH STABILITY: PHYSICAL HEALTH: ON AVERAGE, HOW MANY DAYS PER WEEK DO YOU ENGAGE IN MODERATE TO STRENUOUS EXERCISE (LIKE A BRISK WALK)?: 5 DAYS

## 2025-05-09 SDOH — HEALTH STABILITY: PHYSICAL HEALTH: ON AVERAGE, HOW MANY MINUTES DO YOU ENGAGE IN EXERCISE AT THIS LEVEL?: 90 MIN

## 2025-05-09 ASSESSMENT — SOCIAL DETERMINANTS OF HEALTH (SDOH): HOW OFTEN DO YOU GET TOGETHER WITH FRIENDS OR RELATIVES?: TWICE A WEEK

## 2025-05-14 ENCOUNTER — RESULTS FOLLOW-UP (OUTPATIENT)
Dept: FAMILY MEDICINE | Facility: OTHER | Age: 35
End: 2025-05-14

## 2025-05-14 ENCOUNTER — OFFICE VISIT (OUTPATIENT)
Dept: FAMILY MEDICINE | Facility: OTHER | Age: 35
End: 2025-05-14
Attending: FAMILY MEDICINE
Payer: COMMERCIAL

## 2025-05-14 VITALS
SYSTOLIC BLOOD PRESSURE: 116 MMHG | BODY MASS INDEX: 27.3 KG/M2 | OXYGEN SATURATION: 97 % | DIASTOLIC BLOOD PRESSURE: 70 MMHG | HEIGHT: 71 IN | WEIGHT: 195 LBS | TEMPERATURE: 98.4 F | HEART RATE: 89 BPM

## 2025-05-14 DIAGNOSIS — Z13.220 LIPID SCREENING: ICD-10-CM

## 2025-05-14 DIAGNOSIS — F41.9 ANXIETY: ICD-10-CM

## 2025-05-14 DIAGNOSIS — Z00.00 ROUTINE GENERAL MEDICAL EXAMINATION AT A HEALTH CARE FACILITY: Primary | ICD-10-CM

## 2025-05-14 DIAGNOSIS — Z13.220 LIPID SCREENING: Primary | ICD-10-CM

## 2025-05-14 LAB
ALBUMIN SERPL BCG-MCNC: 4.5 G/DL (ref 3.5–5.2)
ALP SERPL-CCNC: 62 U/L (ref 40–150)
ALT SERPL W P-5'-P-CCNC: 34 U/L (ref 0–70)
ANION GAP SERPL CALCULATED.3IONS-SCNC: 9 MMOL/L (ref 7–15)
AST SERPL W P-5'-P-CCNC: 32 U/L (ref 0–45)
BILIRUB SERPL-MCNC: 1 MG/DL
BUN SERPL-MCNC: 21.1 MG/DL (ref 6–20)
CALCIUM SERPL-MCNC: 9.4 MG/DL (ref 8.8–10.4)
CHLORIDE SERPL-SCNC: 104 MMOL/L (ref 98–107)
CHOLEST SERPL-MCNC: 170 MG/DL
CREAT SERPL-MCNC: 1.03 MG/DL (ref 0.67–1.17)
EGFRCR SERPLBLD CKD-EPI 2021: >90 ML/MIN/1.73M2
FASTING STATUS PATIENT QL REPORTED: NO
FASTING STATUS PATIENT QL REPORTED: NO
GLUCOSE SERPL-MCNC: 91 MG/DL (ref 70–99)
HCO3 SERPL-SCNC: 26 MMOL/L (ref 22–29)
HDLC SERPL-MCNC: 51 MG/DL
HOLD SPECIMEN: NORMAL
LDLC SERPL CALC-MCNC: 41 MG/DL
NONHDLC SERPL-MCNC: 119 MG/DL
POTASSIUM SERPL-SCNC: 4.4 MMOL/L (ref 3.4–5.3)
PROT SERPL-MCNC: 7.9 G/DL (ref 6.4–8.3)
SODIUM SERPL-SCNC: 139 MMOL/L (ref 135–145)
TRIGL SERPL-MCNC: 392 MG/DL

## 2025-05-14 ASSESSMENT — PAIN SCALES - GENERAL: PAINLEVEL_OUTOF10: MODERATE PAIN (5)

## 2025-05-14 NOTE — PROGRESS NOTES
"Preventive Care Visit  [facility] [place]  Chivo Kemp MD, Family Medicine  May 14, 2025      Assessment & Plan     Lipid screening  Update labs.    - Comprehensive metabolic panel; Future  - Lipid Profile; Future  - Comprehensive metabolic panel  - Lipid Profile    Routine general medical examination at a health care facility  Doing well.  Routine cares and follow.      Anxiety  Off lexapro. And doing well.  No additional action today.  He is invited back to talk should the need arise.  He's fine.               BMI  Estimated body mass index is 27.2 kg/m  as calculated from the following:    Height as of this encounter: 1.803 m (5' 11\").    Weight as of this encounter: 88.5 kg (195 lb).       Counseling  Appropriate preventive services were addressed with this patient via screening, questionnaire, or discussion as appropriate for fall prevention, nutrition, physical activity, Tobacco-use cessation, social engagement, weight loss and cognition.  Checklist reviewing preventive services available has been given to the patient.  Reviewed patient's diet, addressing concerns and/or questions.   He is at risk for psychosocial distress and has been provided with information to reduce risk.           Live Fishman is a 35 year old, presenting for the following:  Physical        5/14/2025     1:18 PM   Additional Questions   Roomed by Agueda YING         Advance Care Planning    Discussed advance care planning with patient; however, patient declined at this time.        5/9/2025   General Health   How would you rate your overall physical health? Good   Feel stress (tense, anxious, or unable to sleep) Very much   (!) STRESS CONCERN      5/9/2025   Nutrition   Three or more servings of calcium each day? Yes   Diet: Regular (no restrictions)   How many servings of fruit and vegetables per day? (!) 2-3   How many sweetened beverages each day? 0-1         5/9/2025   Exercise   Days per week of moderate/strenous " exercise 5 days   Average minutes spent exercising at this level 90 min         5/9/2025   Social Factors   Frequency of gathering with friends or relatives Twice a week   Worry food won't last until get money to buy more No   Food not last or not have enough money for food? No   Do you have housing? (Housing is defined as stable permanent housing and does not include staying outside in a car, in a tent, in an abandoned building, in an overnight shelter, or couch-surfing.) Yes   Are you worried about losing your housing? No   Lack of transportation? No   Unable to get utilities (heat,electricity)? No         5/9/2025   Dental   Dentist two times every year? Yes         Today's PHQ-2 Score:       5/13/2025     4:05 PM   PHQ-2 ( 1999 [employer])   Q1: Little interest or pleasure in doing things 1   Q2: Feeling down, depressed or hopeless 0   PHQ-2 Score 1    Q1: Little interest or pleasure in doing things Several days   Q2: Feeling down, depressed or hopeless Not at all   PHQ-2 Score 1       Patient-reported           5/9/2025   Substance Use   Alcohol more than 3/day or more than 7/wk No   Do you use any other substances recreationally? No     Social History     Tobacco Use    Smoking status: Never     Passive exposure: Never    Smokeless tobacco: Current     Types: Chew   Vaping Use    Vaping status: Never Used   Substance Use Topics    Alcohol use: Yes     Comment: social    Drug use: No           5/9/2025   STI Screening   New sexual partner(s) since last STI/HIV test? No         5/9/2025   Contraception/Family Planning   Questions about contraception or family planning No        Reviewed and updated as needed this visit by Provider                    Past Medical History:   Diagnosis Date    Bronchitis, not specified as acute or chronic 3/1/2000     No past surgical history on file.      Review of Systems  CONSTITUTIONAL: NEGATIVE for fever, chills, change in weight  INTEGUMENTARY/SKIN: NEGATIVE for worrisome rashes,  "moles or lesions  EYES: NEGATIVE for vision changes or irritation  ENT/MOUTH: NEGATIVE for ear, mouth and throat problems  RESP: NEGATIVE for significant cough or SOB  BREAST: NEGATIVE for masses, tenderness or discharge  CV: NEGATIVE for chest pain, palpitations or peripheral edema  GI: NEGATIVE for nausea, abdominal pain, heartburn, or change in bowel habits  : NEGATIVE for frequency, dysuria, or hematuria  MUSCULOSKELETAL: NEGATIVE for significant arthralgias or myalgia  NEURO: NEGATIVE for weakness, dizziness or paresthesias  ENDOCRINE: NEGATIVE for temperature intolerance, skin/hair changes  HEME: NEGATIVE for bleeding problems  PSYCHIATRIC: NEGATIVE for changes in mood or affect     Objective    Exam  /70   Pulse 89   Temp 98.4  F (36.9  C) (Tympanic)   Ht 1.803 m (5' 11\")   Wt 88.5 kg (195 lb)   SpO2 97%   BMI 27.20 kg/m     Estimated body mass index is 27.2 kg/m  as calculated from the following:    Height as of this encounter: 1.803 m (5' 11\").    Weight as of this encounter: 88.5 kg (195 lb).    Physical Exam  GENERAL: alert and no distress  EYES: Eyes grossly normal to inspection, PERRL and conjunctivae and sclerae normal  HENT: ear canals and TM's normal, nose and mouth without ulcers or lesions  NECK: no adenopathy, no asymmetry, masses, or scars  RESP: lungs clear to auscultation - no rales, rhonchi or wheezes  CV: regular rate and rhythm, normal S1 S2, no S3 or S4, no murmur, click or rub, no peripheral edema  ABDOMEN: soft, nontender, no hepatosplenomegaly, no masses and bowel sounds normal   (male): normal male genitalia without lesions or urethral discharge, no hernia  MS: no gross musculoskeletal defects noted, no edema  SKIN: no suspicious lesions or rashes  NEURO: Normal strength and tone, mentation intact and speech normal  PSYCH: mentation appears normal, affect normal/bright        Signed Electronically by: Chivo Kemp MD    "

## 2025-05-21 ENCOUNTER — LAB (OUTPATIENT)
Dept: LAB | Facility: OTHER | Age: 35
End: 2025-05-21
Payer: COMMERCIAL

## 2025-05-21 ENCOUNTER — RESULTS FOLLOW-UP (OUTPATIENT)
Dept: FAMILY MEDICINE | Facility: OTHER | Age: 35
End: 2025-05-21

## 2025-05-21 DIAGNOSIS — Z13.220 LIPID SCREENING: ICD-10-CM

## 2025-05-21 LAB
CHOLEST SERPL-MCNC: 186 MG/DL
FASTING STATUS PATIENT QL REPORTED: YES
HDLC SERPL-MCNC: 56 MG/DL
LDLC SERPL CALC-MCNC: 110 MG/DL
NONHDLC SERPL-MCNC: 130 MG/DL
TRIGL SERPL-MCNC: 102 MG/DL

## 2025-05-21 PROCEDURE — 36415 COLL VENOUS BLD VENIPUNCTURE: CPT

## 2025-05-21 PROCEDURE — 80061 LIPID PANEL: CPT
